# Patient Record
Sex: FEMALE | Race: WHITE | Employment: FULL TIME | RURAL
[De-identification: names, ages, dates, MRNs, and addresses within clinical notes are randomized per-mention and may not be internally consistent; named-entity substitution may affect disease eponyms.]

---

## 2017-01-16 DIAGNOSIS — Z12.31 ENCOUNTER FOR SCREENING MAMMOGRAM FOR BREAST CANCER: ICD-10-CM

## 2017-03-21 ENCOUNTER — OFFICE VISIT (OUTPATIENT)
Dept: FAMILY MEDICINE CLINIC | Age: 47
End: 2017-03-21

## 2017-03-21 VITALS
BODY MASS INDEX: 34.01 KG/M2 | SYSTOLIC BLOOD PRESSURE: 136 MMHG | DIASTOLIC BLOOD PRESSURE: 84 MMHG | HEIGHT: 66 IN | TEMPERATURE: 97.4 F | WEIGHT: 211.6 LBS | RESPIRATION RATE: 18 BRPM | OXYGEN SATURATION: 95 % | HEART RATE: 69 BPM

## 2017-03-21 DIAGNOSIS — I10 ESSENTIAL HYPERTENSION: Primary | ICD-10-CM

## 2017-03-21 DIAGNOSIS — M72.2 PLANTAR FASCIITIS, BILATERAL: ICD-10-CM

## 2017-03-21 RX ORDER — LISINOPRIL 10 MG/1
10 TABLET ORAL DAILY
Qty: 90 TAB | Refills: 1 | Status: SHIPPED | OUTPATIENT
Start: 2017-03-21 | End: 2017-10-12 | Stop reason: SDUPTHER

## 2017-03-21 NOTE — MR AVS SNAPSHOT
Visit Information Date & Time Provider Department Dept. Phone Encounter #  
 3/21/2017 11:00 AM Eugenio Martin MD [facility] [phone] [id number] Follow-up Instructions Return in about 6 months (around 9/21/2017). Upcoming Health Maintenance Date Due DTaP/Tdap/Td series (1 - Tdap) 12/24/1990 PAP AKA CERVICAL CYTOLOGY 12/24/1990 INFLUENZA AGE 9 TO ADULT 8/1/2016 Allergies as of 3/21/2017  Review Complete On: 3/21/2017 By: Eugenio Martin MD  
  
 Severity Noted Reaction Type Reactions Penicillins  11/16/2011    Rash, Itching Sulfa (Sulfonamide Antibiotics)  11/16/2011    Itching Current Immunizations  Reviewed on 10/9/2014 Name Date Hib 6/4/2014 Influenza Vaccine 10/5/2014 Not reviewed this visit You Were Diagnosed With   
  
 Codes Comments Essential hypertension    -  Primary ICD-10-CM: I10 
ICD-9-CM: 401.9 Vitals Height(growth percentile) LMP OB Status Smoking Status 5' 6\" (1.676 m) 10/26/2012 Hysterectomy Never Smoker Preferred Pharmacy Pharmacy Name Phone [facility] [place] [address], [place] - [address] [phone] Your Updated Medication List  
  
   
This list is accurate as of: 3/21/17 11:21 AM.  Always use your most recent med list.  
  
  
  
  
 lisinopril 10 mg tablet Commonly known as:  Gricel Garcia Take 1 Tab by mouth daily. VITAMIN D3 1,000 unit Cap Generic drug:  cholecalciferol Take  by mouth. Prescriptions Sent to Pharmacy Refills  
 lisinopril (PRINIVIL, ZESTRIL) 10 mg tablet 1 Sig: Take 1 Tab by mouth daily. Class: Normal  
 Pharmacy: [address] [facility] [address] Ph #: [phone] Route: Oral  
  
Follow-up Instructions Return in about 6 months (around 9/21/2017). Introducing [facility] & HEALTH SERVICES! [employer] introduces [employer] patient portal. Now you can access parts of your medical record, email your doctor's office, and request medication refills online. 1. In your internet browser, go to [email or web address] 2. Click on the First Time User? Click Here link in the Sign In box. You will see the New Member Sign Up page. 3. Enter your Diet TV Access Code exactly as it appears below. You will not need to use this code after youve completed the sign-up process. If you do not sign up before the expiration date, you must request a new code. · [employer] Access Code: [email or web address] Expires: 6/19/2017 11:21 AM 
 
4. Enter the last four digits of your Social Security Number (xxxx) and Date of Birth (mm/dd/yyyy) as indicated and click Submit. You will be taken to the next sign-up page. 5. Create a Diet TV ID. This will be your Diet TV login ID and cannot be changed, so think of one that is secure and easy to remember. 6. Create a Diet TV password. You can change your password at any time. 7. Enter your Password Reset Question and Answer. This can be used at a later time if you forget your password. 8. Enter your e-mail address. You will receive e-mail notification when new information is available in [address] 9. Click Sign Up. You can now view and download portions of your medical record. 10. Click the Download Summary menu link to download a portable copy of your medical information. If you have questions, please visit the Frequently Asked Questions section of the Diet TV website. Remember, Diet TV is NOT to be used for urgent needs. For medical emergencies, dial 911. Now available from your iPhone and Android! Please provide this summary of care documentation to your next provider. Your primary care clinician is listed as Renée Ceron. If you have any questions after today's visit, please call [phone].

## 2017-03-21 NOTE — PROGRESS NOTES
HISTORY OF PRESENT ILLNESS  Margaret Ormond is a 51 y.o. female. Hypertension    Pertinent negatives include no chest pain, no malaise/fatigue, no headaches and no dizziness. She recently became established as a patient. Her BP had been elevated for the last few months and she was started on Lisinopril in Dec. Tolerating well. No cough. She is having right heel pain. Worse first thing in the am and after sitting for a period of time. She is doing her stretches in the am and pm and symptoms have improved. Had an abnormal mammo in Jan with negative biopsy. Review of Systems   Constitutional: Negative for fever and malaise/fatigue. Respiratory: Negative for cough. Cardiovascular: Negative for chest pain. Gastrointestinal: Negative for abdominal pain. Musculoskeletal: Positive for joint pain. Neurological: Negative for dizziness and headaches. Past Medical History:   Diagnosis Date    Fibroids     Menorrhagia     Vitamin D deficiency      Past Surgical History:   Procedure Laterality Date    BIOPSY OF BREAST, NEEDLE CORE Right 01/12/2017    Benign    BREAST SURGERY PROCEDURE UNLISTED  12/10    breast bx-benign    HX CHOLECYSTECTOMY      HX GYN      hysterectomy    HX HEENT      tonsillectomy     Allergies   Allergen Reactions    Penicillins Rash and Itching    Sulfa (Sulfonamide Antibiotics) Itching     Height 5' 6\" (1.676 m), last menstrual period 10/26/2012. Physical Exam   Constitutional: She appears well-developed and well-nourished. No distress. HENT:   Head: Normocephalic. Mouth/Throat: Oropharynx is clear and moist.   Eyes: Conjunctivae are normal.   Neck: Neck supple. Cardiovascular: Normal rate, regular rhythm and normal heart sounds. Pulmonary/Chest: Effort normal and breath sounds normal. No respiratory distress. Neurological: She is alert. Skin: Skin is warm. Psychiatric: She has a normal mood and affect. Vitals reviewed.     Results for orders placed or performed in visit on 33/42/70   METABOLIC PANEL, COMPREHENSIVE   Result Value Ref Range    Glucose 106 (H) 65 - 99 mg/dL    BUN 19 6 - 24 mg/dL    Creatinine 0.88 0.57 - 1.00 mg/dL    GFR est non-AA 79 >59 mL/min/1.73    GFR est AA 91 >59 mL/min/1.73    BUN/Creatinine ratio 22 9 - 23    Sodium 140 134 - 144 mmol/L    Potassium 4.8 3.5 - 5.2 mmol/L    Chloride 101 96 - 106 mmol/L    CO2 24 18 - 29 mmol/L    Calcium 9.0 8.7 - 10.2 mg/dL    Protein, total 7.1 6.0 - 8.5 g/dL    Albumin 4.2 3.5 - 5.5 g/dL    GLOBULIN, TOTAL 2.9 1.5 - 4.5 g/dL    A-G Ratio 1.4 1.1 - 2.5    Bilirubin, total 0.6 0.0 - 1.2 mg/dL    Alk. phosphatase 80 39 - 117 IU/L    AST (SGOT) 16 0 - 40 IU/L    ALT (SGPT) 13 0 - 32 IU/L   THYROID PANEL W/TSH   Result Value Ref Range    TSH 1.200 0.450 - 4.500 uIU/mL    T4, Total 10.4 4.5 - 12.0 ug/dL    T3 Uptake 26 24 - 39 %    Free Thyroxine Index 2.7 1.2 - 4.9   HEMOGLOBIN A1C WITH EAG   Result Value Ref Range    Hemoglobin A1c 5.7 (H) 4.8 - 5.6 %    Estimated average glucose 117 mg/dL   LIPID PANEL   Result Value Ref Range    Cholesterol, total 155 100 - 199 mg/dL    Triglyceride 85 0 - 149 mg/dL    HDL Cholesterol 47 >39 mg/dL    VLDL, calculated 17 5 - 40 mg/dL    LDL, calculated 91 0 - 99 mg/dL   CBC WITH AUTOMATED DIFF   Result Value Ref Range    WBC 9.9 3.4 - 10.8 x10E3/uL    RBC 5.08 3.77 - 5.28 x10E6/uL    HGB 14.9 11.1 - 15.9 g/dL    HCT 44.3 34.0 - 46.6 %    MCV 87 79 - 97 fL    MCH 29.3 26.6 - 33.0 pg    MCHC 33.6 31.5 - 35.7 g/dL    RDW 13.3 12.3 - 15.4 %    PLATELET 146 303 - 691 x10E3/uL    NEUTROPHILS 67 %    Lymphocytes 25 %    MONOCYTES 5 %    EOSINOPHILS 3 %    BASOPHILS 0 %    ABS. NEUTROPHILS 6.6 1.4 - 7.0 x10E3/uL    Abs Lymphocytes 2.5 0.7 - 3.1 x10E3/uL    ABS. MONOCYTES 0.5 0.1 - 0.9 x10E3/uL    ABS. EOSINOPHILS 0.3 0.0 - 0.4 x10E3/uL    ABS. BASOPHILS 0.0 0.0 - 0.2 x10E3/uL    IMMATURE GRANULOCYTES 0 %    ABS. IMM.  GRANS. 0.0 0.0 - 0.1 x10E3/uL   CVD REPORT   Result Value Ref Range    INTERPRETATION Note        ASSESSMENT and PLAN  HTN   Continue Lisinopril 10mg daily   RTO 6 months  Plantar fascitis   Continue exercises    Heel cups, change shoes every few hours

## 2017-10-12 ENCOUNTER — OFFICE VISIT (OUTPATIENT)
Dept: FAMILY MEDICINE CLINIC | Age: 47
End: 2017-10-12

## 2017-10-12 VITALS
DIASTOLIC BLOOD PRESSURE: 71 MMHG | SYSTOLIC BLOOD PRESSURE: 124 MMHG | RESPIRATION RATE: 17 BRPM | OXYGEN SATURATION: 97 % | WEIGHT: 217 LBS | TEMPERATURE: 97.5 F | HEART RATE: 100 BPM | HEIGHT: 66 IN | BODY MASS INDEX: 34.87 KG/M2

## 2017-10-12 DIAGNOSIS — R63.5 WEIGHT GAIN: ICD-10-CM

## 2017-10-12 DIAGNOSIS — I10 ESSENTIAL HYPERTENSION: Primary | ICD-10-CM

## 2017-10-12 DIAGNOSIS — R73.09 ELEVATED GLUCOSE: ICD-10-CM

## 2017-10-12 RX ORDER — LISINOPRIL 10 MG/1
10 TABLET ORAL DAILY
Qty: 90 TAB | Refills: 1 | Status: SHIPPED | OUTPATIENT
Start: 2017-10-12 | End: 2018-01-29 | Stop reason: SDUPTHER

## 2017-10-12 NOTE — PROGRESS NOTES
HISTORY OF PRESENT ILLNESS  Kelly Jefferson is a 51 y.o. female. Hypertension    Pertinent negatives include no chest pain, no palpitations, no malaise/fatigue, no headaches and no dizziness. She is here for BP follow up. She was started on Lisinopril a year or so ago. Tolerating well. No cough. She was having right heel pain. Resolved with stretches and ice. She is concerned that she is not able to lose weight. She has been watching her diet but not able to exercise very much. Drinking regular sodas- usually 2-3 a day. Would like to start meds to help. Drinks milk in the am for breakfast, salad for lunch and a regular dinner. Not snacking. Review of Systems   Constitutional: Negative for fever, malaise/fatigue and weight loss. HENT: Negative for congestion and sore throat. Respiratory: Negative for cough and sputum production. Cardiovascular: Negative for chest pain and palpitations. Gastrointestinal: Negative for abdominal pain and heartburn. Neurological: Negative for dizziness and headaches. Psychiatric/Behavioral: Negative for depression. The patient is nervous/anxious. Past Medical History:   Diagnosis Date    Fibroids     Menorrhagia     Vitamin D deficiency      Past Surgical History:   Procedure Laterality Date    BIOPSY OF BREAST, NEEDLE CORE Right 01/12/2017    Benign    BREAST SURGERY PROCEDURE UNLISTED  12/10    breast bx-benign    HX CHOLECYSTECTOMY      HX GYN      hysterectomy    HX HEENT      tonsillectomy     Allergies   Allergen Reactions    Penicillins Rash and Itching    Sulfa (Sulfonamide Antibiotics) Itching     Blood pressure 124/71, pulse 100, temperature 97.5 °F (36.4 °C), temperature source Oral, resp. rate 17, height 5' 6\" (1.676 m), weight 217 lb (98.4 kg), last menstrual period 10/26/2012, SpO2 97 %. Physical Exam   Constitutional: She appears well-developed and well-nourished. No distress. HENT:   Head: Normocephalic and atraumatic. Right Ear: External ear normal.   Left Ear: External ear normal.   Nose: Nose normal.   Mouth/Throat: Oropharynx is clear and moist.   Eyes: Conjunctivae are normal.   Neck: Neck supple. Cardiovascular: Normal rate, regular rhythm and normal heart sounds. Pulmonary/Chest: Effort normal and breath sounds normal. No respiratory distress. Lymphadenopathy:     She has no cervical adenopathy. Neurological: She is alert. Skin: Skin is warm. Psychiatric: She has a normal mood and affect. Vitals reviewed.       ASSESSMENT and PLAN  HTN   Continue Lisinopril 10mg daily   RTO 6 months  Plantar fascitis   Continue exercises   Weight Gain   Start Contrave   ADRS discussed   Try to cut out soda   RTO 3 months

## 2017-10-12 NOTE — MR AVS SNAPSHOT
Visit Information Date & Time Provider Department Dept. Phone Encounter #  
 10/12/2017 10:00 AM Vinny Arango MD [facility] Primary [facility] [phone] [id number] Upcoming Health Maintenance Date Due DTaP/Tdap/Td series (1 - Tdap) 12/24/1990 PAP AKA CERVICAL CYTOLOGY 12/24/1990 Allergies as of 10/12/2017  Review Complete On: 10/12/2017 By: Mary Mensah LPN Severity Noted Reaction Type Reactions Penicillins  11/16/2011    Rash, Itching Sulfa (Sulfonamide Antibiotics)  11/16/2011    Itching Current Immunizations  Reviewed on 10/9/2014 Name Date Hib 6/4/2014 Influenza Vaccine 10/6/2017, 10/5/2014 Not reviewed this visit You Were Diagnosed With   
  
 Codes Comments Essential hypertension    -  Primary ICD-10-CM: I10 
ICD-9-CM: 401.9 Weight gain     ICD-10-CM: R63.5 ICD-9-CM: 783.1 Elevated glucose     ICD-10-CM: R73.09 
ICD-9-CM: 790.29 Vitals BP Pulse Temp Resp Height(growth percentile) Weight(growth percentile) 124/71 (BP 1 Location: Right arm, BP Patient Position: Sitting) 100 97.5 °F (36.4 °C) (Oral) 17 5' 6\" (1.676 m) 217 lb (98.4 kg) LMP SpO2 BMI OB Status Smoking Status 10/26/2012 97% 35.02 kg/m2 Hysterectomy Never Smoker BMI and BSA Data Body Mass Index Body Surface Area 35.02 kg/m 2 2.14 m 2 Preferred Pharmacy Pharmacy Name Phone [facility] [place] [address], [place] - [address] [phone] Your Updated Medication List  
  
   
This list is accurate as of: 10/12/17 10:34 AM.  Always use your most recent med list.  
  
  
  
  
 lisinopril 10 mg tablet Commonly known as:  Sheree Cleaning Take 1 Tab by mouth daily. naltrexone-buPROPion 8-90 mg Tber ER tablet Commonly known as:  Main Scot Week 1 1 tab PO QAM, Week 2 1QAM 1QHS, Week 3 2QAM 1 QHS, Week 4 & beyond 2QAM 2QHS  
  
 VITAMIN D3 1,000 unit Cap Generic drug:  cholecalciferol Take  by mouth. Prescriptions Sent to Pharmacy Refills  
 lisinopril (PRINIVIL, ZESTRIL) 10 mg tablet 1 Sig: Take 1 Tab by mouth daily. Class: Normal  
 Pharmacy: [address] [place] [address] Ph #: [phone] Route: Oral  
 naltrexone-buPROPion (CONTRAVE) 8-90 mg TbER ER tablet 0 Sig: Week 1 1 tab PO QAM, Week 2 1QAM 1QHS, Week 3 2QAM 1 QHS, Week 4 & beyond 2QAM 2QHS Class: Normal  
 Pharmacy: [address] [facility] Ph #: [phone] We Performed the Following HEMOGLOBIN A1C WITH EAG [04089 CPT(R)] METABOLIC PANEL, COMPREHENSIVE [06526 CPT(R)] LA COLLECTION VENOUS BLOOD,VENIPUNCTURE B2126876 CPT(R)] TSH 3RD GENERATION [00857 CPT(R)] Introducing [facility] & HEALTH SERVICES! [employer] introduces [employer] patient portal. Now you can access parts of your medical record, email your doctor's office, and request medication refills online. 1. In your internet browser, go to [email or web address] 2. Click on the First Time User? Click Here link in the Sign In box. You will see the New Member Sign Up page. 3. Enter your [employer] Access Code exactly as it appears below. You will not need to use this code after youve completed the sign-up process. If you do not sign up before the expiration date, you must request a new code. · [employer] Access Code: [email or web address] Expires: 1/10/2018 10:34 AM 
 
4. Enter the last four digits of your Social Security Number (xxxx) and Date of Birth (mm/dd/yyyy) as indicated and click Submit. You will be taken to the next sign-up page. 5. Create a MashONt ID. This will be your [employer] login ID and cannot be changed, so think of one that is secure and easy to remember. 6. Create a [employer] password. You can change your password at any time. 7. Enter your Password Reset Question and Answer. This can be used at a later time if you forget your password. 8. Enter your e-mail address. You will receive e-mail notification when new information is available in [address] 9. Click Sign Up. You can now view and download portions of your medical record. 10. Click the Download Summary menu link to download a portable copy of your medical information. If you have questions, please visit the Frequently Asked Questions section of the [employer] website. Remember, [employer] is NOT to be used for urgent needs. For medical emergencies, dial 911. Now available from your iPhone and Android! Please provide this summary of care documentation to your next provider. Your primary care clinician is listed as Clifford Loyola. If you have any questions after today's visit, please call [phone].

## 2017-10-13 LAB
ALBUMIN SERPL-MCNC: 3.9 G/DL (ref 3.5–5.5)
ALBUMIN/GLOB SERPL: 1.4 {RATIO} (ref 1.2–2.2)
ALP SERPL-CCNC: 73 IU/L (ref 39–117)
ALT SERPL-CCNC: 9 IU/L (ref 0–32)
AST SERPL-CCNC: 10 IU/L (ref 0–40)
BILIRUB SERPL-MCNC: 0.3 MG/DL (ref 0–1.2)
BUN SERPL-MCNC: 12 MG/DL (ref 6–24)
BUN/CREAT SERPL: 13 (ref 9–23)
CALCIUM SERPL-MCNC: 8.8 MG/DL (ref 8.7–10.2)
CHLORIDE SERPL-SCNC: 103 MMOL/L (ref 96–106)
CO2 SERPL-SCNC: 25 MMOL/L (ref 18–29)
CREAT SERPL-MCNC: 0.91 MG/DL (ref 0.57–1)
EST. AVERAGE GLUCOSE BLD GHB EST-MCNC: 114 MG/DL
GLOBULIN SER CALC-MCNC: 2.7 G/DL (ref 1.5–4.5)
GLUCOSE SERPL-MCNC: 105 MG/DL (ref 65–99)
HBA1C MFR BLD: 5.6 % (ref 4.8–5.6)
POTASSIUM SERPL-SCNC: 4.6 MMOL/L (ref 3.5–5.2)
PROT SERPL-MCNC: 6.6 G/DL (ref 6–8.5)
SODIUM SERPL-SCNC: 142 MMOL/L (ref 134–144)
TSH SERPL DL<=0.005 MIU/L-ACNC: 1.18 UIU/ML (ref 0.45–4.5)

## 2018-01-29 ENCOUNTER — OFFICE VISIT (OUTPATIENT)
Dept: FAMILY MEDICINE CLINIC | Age: 48
End: 2018-01-29

## 2018-01-29 VITALS
SYSTOLIC BLOOD PRESSURE: 129 MMHG | HEART RATE: 79 BPM | RESPIRATION RATE: 18 BRPM | HEIGHT: 66 IN | OXYGEN SATURATION: 96 % | BODY MASS INDEX: 32.85 KG/M2 | WEIGHT: 204.38 LBS | DIASTOLIC BLOOD PRESSURE: 82 MMHG

## 2018-01-29 DIAGNOSIS — Z12.39 SCREENING FOR BREAST CANCER: ICD-10-CM

## 2018-01-29 DIAGNOSIS — I10 ESSENTIAL HYPERTENSION: Primary | ICD-10-CM

## 2018-01-29 DIAGNOSIS — I73.00 RAYNAUD'S DISEASE WITHOUT GANGRENE: ICD-10-CM

## 2018-01-29 RX ORDER — LISINOPRIL 10 MG/1
10 TABLET ORAL DAILY
Qty: 90 TAB | Refills: 1 | Status: SHIPPED | OUTPATIENT
Start: 2018-01-29 | End: 2018-07-30 | Stop reason: SDUPTHER

## 2018-01-29 NOTE — PROGRESS NOTES
HISTORY OF PRESENT ILLNESS  Graciela Nath is a 49 y.o. female. Hypertension    Pertinent negatives include no chest pain, no palpitations, no malaise/fatigue, no headaches and no dizziness. She is here for BP follow up. She was started on Lisinopril a year or so ago. Tolerating well. No cough. She is concerned that she is not able to lose weight. She has been better with her diet and is exercising more. Cut back on sodas. Drinks milk in the am for breakfast, salad for lunch and a regular dinner. Not snacking. She started Contrave and weight is down 13 pounds. She is having discoloration of her distal fingers for the past couple of months. Started this winter. Review of Systems   Constitutional: Negative for fever, malaise/fatigue and weight loss. HENT: Negative for congestion and sore throat. Respiratory: Negative for cough and sputum production. Cardiovascular: Negative for chest pain and palpitations. Gastrointestinal: Negative for abdominal pain and heartburn. Neurological: Negative for dizziness and headaches. Psychiatric/Behavioral: Negative for depression. The patient is nervous/anxious. Past Medical History:   Diagnosis Date    Fibroids     Menorrhagia     Vitamin D deficiency      Past Surgical History:   Procedure Laterality Date    BREAST SURGERY PROCEDURE UNLISTED  12/10    breast bx-benign    HX CHOLECYSTECTOMY      HX GYN      hysterectomy    HX HEENT      tonsillectomy    TX BIOPSY OF BREAST, NEEDLE CORE Right 01/12/2017    Benign     Allergies   Allergen Reactions    Penicillins Rash and Itching    Sulfa (Sulfonamide Antibiotics) Itching     Blood pressure 129/82, pulse 79, resp. rate 18, height 5' 6\" (1.676 m), weight 204 lb 6 oz (92.7 kg), last menstrual period 10/26/2012, SpO2 96 %. Physical Exam   Constitutional: She appears well-developed and well-nourished. No distress. HENT:   Head: Normocephalic and atraumatic.    Right Ear: External ear normal.   Left Ear: External ear normal.   Nose: Nose normal.   Mouth/Throat: Oropharynx is clear and moist.   Eyes: Conjunctivae are normal.   Neck: Neck supple. Cardiovascular: Normal rate, regular rhythm and normal heart sounds. Pulmonary/Chest: Effort normal and breath sounds normal. No respiratory distress. Lymphadenopathy:     She has no cervical adenopathy. Neurological: She is alert. Skin: Skin is warm. Psychiatric: She has a normal mood and affect. Vitals reviewed.       ASSESSMENT and PLAN  HTN   Continue Lisinopril 10mg daily   RTO 6 months  Weight Gain/BMI 32   Continue Contrave   ADRS discussed   Monitor weight    RTO 3 months  Raynauds   Gloves whenever temp is below 39   RTO or call if worsening symptoms  Screening breast cancer   Schedule Mammo

## 2018-01-29 NOTE — MR AVS SNAPSHOT
[address] [phone] Patient: Eugene Kaye MRN: [id number] :1970 Visit Information Date & Time Provider Department Dept. Phone Encounter #  
 2018  4:20 PM Jameson Zhang  [address] [facility] [phone] [id number] Follow-up Instructions Return in about 6 months (around 2018). Upcoming Health Maintenance Date Due DTaP/Tdap/Td series (1 - Tdap) 1990 PAP AKA CERVICAL CYTOLOGY 1990 Allergies as of 2018  Review Complete On: 2018 By: Jameson Zhang MD  
  
 Severity Noted Reaction Type Reactions Penicillins  2011    Rash, Itching Sulfa (Sulfonamide Antibiotics)  2011    Itching Current Immunizations  Reviewed on 10/9/2014 Name Date Hib 2014 Influenza Vaccine 10/6/2017, 10/5/2014 Not reviewed this visit You Were Diagnosed With   
  
 Codes Comments Essential hypertension    -  Primary ICD-10-CM: I10 
ICD-9-CM: 401.9 Raynaud's disease without gangrene     ICD-10-CM: I73.00 ICD-9-CM: 443.0 Screening for breast cancer     ICD-10-CM: Z12.31 
ICD-9-CM: V76.10 BMI 32.0-32.9,adult     ICD-10-CM: K28.78 
ICD-9-CM: V85.32 Vitals BP Pulse Resp Height(growth percentile) Weight(growth percentile) LMP  
 129/82 (BP 1 Location: Right arm) 79 18 5' 6\" (1.676 m) 204 lb 6 oz (92.7 kg) 10/26/2012 SpO2 BMI OB Status Smoking Status 96% 32.99 kg/m2 Hysterectomy Never Smoker Vitals History BMI and BSA Data Body Mass Index Body Surface Area  
 32.99 kg/m 2 2.08 m 2 Preferred Pharmacy Pharmacy Name Phone [address] [place] [address] [phone] Your Updated Medication List  
  
   
This list is accurate as of: 18  5:14 PM.  Always use your most recent med list.  
  
  
  
  
 lisinopril 10 mg tablet Commonly known as:  Laurent Nicole Take 1 Tab by mouth daily. naltrexone-buPROPion 8-90 mg Tber ER tablet Commonly known as:  Rosario Screen 2QAM 2QHS  
  
 VITAMIN D3 1,000 unit Cap Generic drug:  cholecalciferol Take  by mouth. Prescriptions Sent to Pharmacy Refills  
 lisinopril (PRINIVIL, ZESTRIL) 10 mg tablet 1 Sig: Take 1 Tab by mouth daily. Class: Normal  
 Pharmacy: [facility] [address] [place] [address] Ph #: [phone] Route: Oral  
 naltrexone-buPROPion (CONTRAVE) 8-90 mg TbER ER tablet 5 SiQAM 2QHS Class: Normal  
 Pharmacy: [facility] [address] [place] [address] Ph #: [phone] Follow-up Instructions Return in about 6 months (around 2018). To-Do List   
 2018 Imaging:  JIGNESH MAMMO BI SCREENING INCL CAD Introducing [facility] & HEALTH SERVICES! [facility] introduces [employer] patient portal. Now you can access parts of your medical record, email your doctor's office, and request medication refills online. 1. In your internet browser, go to [email or web address] 2. Click on the First Time User? Click Here link in the Sign In box. You will see the New Member Sign Up page. 3. Enter your Strong[employer] Access Code exactly as it appears below. You will not need to use this code after youve completed the sign-up process. If you do not sign up before the expiration date, you must request a new code. · [employer] Access Code: [email or web address] Expires: 2018  5:14 PM 
 
4. Enter the last four digits of your Social Security Number (xxxx) and Date of Birth (mm/dd/yyyy) as indicated and click Submit. You will be taken to the next sign-up page. 5. Create a [employer] ID. This will be your Strong[employer] login ID and cannot be changed, so think of one that is secure and easy to remember. 6. Create a Wallop password. You can change your password at any time. 7. Enter your Password Reset Question and Answer. This can be used at a later time if you forget your password. 8. Enter your e-mail address. You will receive e-mail notification when new information is available in [address] 9. Click Sign Up. You can now view and download portions of your medical record. 10. Click the Download Summary menu link to download a portable copy of your medical information. If you have questions, please visit the Frequently Asked Questions section of the [employer] website. Remember, Wall[employer] is NOT to be used for urgent needs. For medical emergencies, dial 911. Now available from your iPhone and Android! Please provide this summary of care documentation to your next provider. Your primary care clinician is listed as Dionte Burden. If you have any questions after today's visit, please call [phone].

## 2018-07-30 ENCOUNTER — OFFICE VISIT (OUTPATIENT)
Dept: FAMILY MEDICINE CLINIC | Age: 48
End: 2018-07-30

## 2018-07-30 VITALS
BODY MASS INDEX: 32.3 KG/M2 | HEIGHT: 66 IN | WEIGHT: 201 LBS | RESPIRATION RATE: 18 BRPM | SYSTOLIC BLOOD PRESSURE: 120 MMHG | DIASTOLIC BLOOD PRESSURE: 78 MMHG | OXYGEN SATURATION: 97 % | HEART RATE: 77 BPM

## 2018-07-30 DIAGNOSIS — I10 ESSENTIAL HYPERTENSION: Primary | ICD-10-CM

## 2018-07-30 RX ORDER — LISINOPRIL 10 MG/1
10 TABLET ORAL DAILY
Qty: 90 TAB | Refills: 1 | Status: SHIPPED | OUTPATIENT
Start: 2018-07-30 | End: 2019-01-28 | Stop reason: SDUPTHER

## 2018-07-30 NOTE — PROGRESS NOTES
HISTORY OF PRESENT ILLNESS Sylvia Zacarias is a 49 y.o. female. Hypertension Associated symptoms include nausea. Pertinent negatives include no chest pain, no palpitations, no malaise/fatigue, no headaches and no dizziness. She is here for BP follow up. She is on Lisinopril. Tolerating well. No cough. She has been better with her diet and is exercising more. Cut back on sodas. Drinks milk in the am for breakfast, salad for lunch and a regular dinner. Was on vacation and cheated some. She has been on Contrave for the past 6 months and weight is down 16  pounds. Can't take the 4 tablets a day due to nausea. On 1 BID. Had an abnormal mammo at [facility] and has a follow up scheduled. Possible biopsy may be needed. Has GYN care with Dr Bishop Mar. Review of Systems Constitutional: Negative for fever, malaise/fatigue and weight loss. HENT: Negative for congestion and sore throat. Respiratory: Negative for cough and sputum production. Cardiovascular: Negative for chest pain and palpitations. Gastrointestinal: Positive for nausea. Negative for abdominal pain and heartburn. Neurological: Negative for dizziness and headaches. Psychiatric/Behavioral: Negative for depression. Past Medical History:  
Diagnosis Date  Fibroids  Menorrhagia  Vitamin D deficiency Past Surgical History:  
Procedure Laterality Date  BREAST SURGERY PROCEDURE UNLISTED  12/10  
 breast bx-benign  HX CHOLECYSTECTOMY  HX GYN    
 hysterectomy  HX HEENT    
 tonsillectomy  OK BIOPSY OF BREAST, NEEDLE CORE Right 01/12/2017 Benign Allergies Allergen Reactions  Penicillins Rash and Itching  Sulfa (Sulfonamide Antibiotics) Itching Blood pressure 120/78, pulse 77, resp. rate 18, height 5' 6\" (1.676 m), weight 201 lb (91.2 kg), last menstrual period 10/26/2012, SpO2 97 %. Physical Exam  
Constitutional: She is oriented to person, place, and time.  She appears well-developed and well-nourished. No distress. HENT:  
Head: Normocephalic and atraumatic. Nose: Nose normal.  
Mouth/Throat: Oropharynx is clear and moist.  
Eyes: Conjunctivae are normal.  
Neck: Neck supple. Cardiovascular: Normal rate, regular rhythm and normal heart sounds. Pulmonary/Chest: Effort normal and breath sounds normal. No respiratory distress. Lymphadenopathy:  
  She has no cervical adenopathy. Neurological: She is alert and oriented to person, place, and time. Skin: Skin is warm. Psychiatric: She has a normal mood and affect. Vitals reviewed. ASSESSMENT and PLAN 
HTN Continue Lisinopril 10mg daily Check labs RTO 6 months Weight Gain/BMI 32 Continue Contrave and try to increase to 2 po BID ADRS discussed Monitor weight RTO 6 months Abnormal Mammo Has a repeat scheduled and will ask them to send results

## 2018-07-30 NOTE — PROGRESS NOTES
1. Have you been to the ER, urgent care clinic since your last visit? Hospitalized since your last visit? No 
 
2. Have you seen or consulted any other health care providers outside of the [address] [place] [address] since your last visit? Include any pap smears or colon screening.  No

## 2018-07-30 NOTE — MR AVS SNAPSHOT
[address] [phone] Patient: Chayito Laurent MRN: [id number] :[id number] Visit Information Date & Time Provider Department Dept. Phone Encounter #  
 2018  9:00 AM Melania Reece MD [address] [address] [phone] [id number] Follow-up Instructions Return in about 6 months (around 2019). Follow-up and Disposition History Upcoming Health Maintenance Date Due DTaP/Tdap/Td series (1 - Tdap) 1990 PAP AKA CERVICAL CYTOLOGY 1990 Influenza Age 5 to Adult 2018 Allergies as of 2018  Review Complete On: 2018 By: Melania Reece MD  
  
 Severity Noted Reaction Type Reactions Penicillins  2011    Rash, Itching Sulfa (Sulfonamide Antibiotics)  2011    Itching Current Immunizations  Reviewed on 10/9/2014 Name Date Hib 2014 Influenza Vaccine 10/6/2017, 10/5/2014 Not reviewed this visit You Were Diagnosed With   
  
 Codes Comments Essential hypertension    -  Primary ICD-10-CM: I10 
ICD-9-CM: 401.9 BMI 32.0-32.9,adult     ICD-10-CM: I68.29 
ICD-9-CM: V85.32 Vitals BP Pulse Resp Height(growth percentile) Weight(growth percentile) LMP  
 120/78 (BP 1 Location: Left arm) 77 18 5' 6\" (1.676 m) 201 lb (91.2 kg) 10/26/2012 SpO2 BMI OB Status Smoking Status 97% 32.44 kg/m2 Hysterectomy Never Smoker Vitals History BMI and BSA Data Body Mass Index Body Surface Area  
 32.44 kg/m 2 2.06 m 2 Preferred Pharmacy Pharmacy Name Phone [address] [phone] Your Updated Medication List  
  
   
This list is accurate as of 18  9:38 AM.  Always use your most recent med list.  
  
  
  
  
 lisinopril 10 mg tablet Commonly known as:  Ruth Mckeon  
 Take 1 Tab by mouth daily. naltrexone-buPROPion 8-90 mg Tber ER tablet Commonly known as:  [place] 2QAM 2QHS  
  
 VITAMIN D3 1,000 unit Cap Generic drug:  cholecalciferol Take  by mouth. Prescriptions Sent to Pharmacy Refills  
 lisinopril (PRINIVIL, ZESTRIL) 10 mg tablet 1 Sig: Take 1 Tab by mouth daily. Class: Normal  
 Pharmacy: [facility] [address] Ph #: [phone] Route: Oral  
 naltrexone-buPROPion (CONTRAVE) 8-90 mg TbER ER tablet 5 SiQAM 2QHS Class: Normal  
 Pharmacy: [facility] [address] Ph #: [phone] We Performed the Following CBC WITH AUTOMATED DIFF [00470 CPT(R)] METABOLIC PANEL, COMPREHENSIVE [22782 CPT(R)] Follow-up Instructions Return in about 6 months (around 2019). Introducing [facility]! [facility] introduces 365webcall patient portal. Now you can access parts of your medical record, email your doctor's office, and request medication refills online. 1. In your internet browser, go to [email or web address] 2. Click on the First Time User? Click Here link in the Sign In box. You will see the New Member Sign Up page. 3. Enter your 365webcall Access Code exactly as it appears below. You will not need to use this code after youve completed the sign-up process. If you do not sign up before the expiration date, you must request a new code. · 365webcall Access Code: [email or web address] Expires: 10/28/2018  9:37 AM 
 
4. Enter the last four digits of your Social Security Number (xxxx) and Date of Birth (mm/dd/yyyy) as indicated and click Submit. You will be taken to the next sign-up page. 5. Create a 365webcall ID. This will be your 365webcall login ID and cannot be changed, so think of one that is secure and easy to remember. 6. Create a 365webcall password. You can change your password at any time. 7. Enter your Password Reset Question and Answer. This can be used at a later time if you forget your password. 8. Enter your e-mail address. You will receive e-mail notification when new information is available in [address] 9. Click Sign Up. You can now view and download portions of your medical record. 10. Click the Download Summary menu link to download a portable copy of your medical information. If you have questions, please visit the Frequently Asked Questions section of the [employer] website. Remember, [employer] is NOT to be used for urgent needs. For medical emergencies, dial 911. Now available from your iPhone and Android! Please provide this summary of care documentation to your next provider. Your primary care clinician is listed as Elena Dominguez. If you have any questions after today's visit, please call [phone].

## 2018-07-31 LAB
ALBUMIN SERPL-MCNC: 4.3 G/DL (ref 3.5–5.5)
ALBUMIN/GLOB SERPL: 1.7 {RATIO} (ref 1.2–2.2)
ALP SERPL-CCNC: 77 IU/L (ref 39–117)
ALT SERPL-CCNC: 11 IU/L (ref 0–32)
AST SERPL-CCNC: 13 IU/L (ref 0–40)
BASOPHILS # BLD AUTO: 0 X10E3/UL (ref 0–0.2)
BASOPHILS NFR BLD AUTO: 0 %
BILIRUB SERPL-MCNC: 0.2 MG/DL (ref 0–1.2)
BUN SERPL-MCNC: 20 MG/DL (ref 6–24)
BUN/CREAT SERPL: 18 (ref 9–23)
CALCIUM SERPL-MCNC: 8.9 MG/DL (ref 8.7–10.2)
CHLORIDE SERPL-SCNC: 101 MMOL/L (ref 96–106)
CO2 SERPL-SCNC: 23 MMOL/L (ref 20–29)
CREAT SERPL-MCNC: 1.12 MG/DL (ref 0.57–1)
EOSINOPHIL # BLD AUTO: 0.2 X10E3/UL (ref 0–0.4)
EOSINOPHIL NFR BLD AUTO: 3 %
ERYTHROCYTE [DISTWIDTH] IN BLOOD BY AUTOMATED COUNT: 13.2 % (ref 12.3–15.4)
GLOBULIN SER CALC-MCNC: 2.5 G/DL (ref 1.5–4.5)
GLUCOSE SERPL-MCNC: 100 MG/DL (ref 65–99)
HCT VFR BLD AUTO: 42.1 % (ref 34–46.6)
HGB BLD-MCNC: 14 G/DL (ref 11.1–15.9)
IMM GRANULOCYTES # BLD: 0 X10E3/UL (ref 0–0.1)
IMM GRANULOCYTES NFR BLD: 0 %
INTERPRETATION: NORMAL
LYMPHOCYTES # BLD AUTO: 1.8 X10E3/UL (ref 0.7–3.1)
LYMPHOCYTES NFR BLD AUTO: 22 %
MCH RBC QN AUTO: 29.8 PG (ref 26.6–33)
MCHC RBC AUTO-ENTMCNC: 33.3 G/DL (ref 31.5–35.7)
MCV RBC AUTO: 90 FL (ref 79–97)
MONOCYTES # BLD AUTO: 0.4 X10E3/UL (ref 0.1–0.9)
MONOCYTES NFR BLD AUTO: 4 %
NEUTROPHILS # BLD AUTO: 5.7 X10E3/UL (ref 1.4–7)
NEUTROPHILS NFR BLD AUTO: 71 %
PLATELET # BLD AUTO: 233 X10E3/UL (ref 150–379)
POTASSIUM SERPL-SCNC: 4.4 MMOL/L (ref 3.5–5.2)
PROT SERPL-MCNC: 6.8 G/DL (ref 6–8.5)
RBC # BLD AUTO: 4.7 X10E6/UL (ref 3.77–5.28)
SODIUM SERPL-SCNC: 139 MMOL/L (ref 134–144)
WBC # BLD AUTO: 8.2 X10E3/UL (ref 3.4–10.8)

## 2019-08-19 RX ORDER — TOPIRAMATE 100 MG/1
100 TABLET, FILM COATED ORAL
Qty: 60 TAB | Refills: 0 | OUTPATIENT
Start: 2019-08-19 | End: 2019-10-08 | Stop reason: SINTOL

## 2019-08-19 RX ORDER — PHENTERMINE HYDROCHLORIDE 15 MG/1
15 CAPSULE ORAL
Qty: 60 CAP | Refills: 0 | OUTPATIENT
Start: 2019-08-19 | End: 2019-10-08 | Stop reason: SDUPTHER

## 2020-12-10 PROBLEM — E66.01 SEVERE OBESITY (HCC): Status: ACTIVE | Noted: 2020-12-10

## 2021-05-19 PROBLEM — R73.03 PREDIABETES: Status: ACTIVE | Noted: 2021-05-19

## 2021-05-19 NOTE — PROGRESS NOTES
Subjective:     CC: HTN    Kimberly Bishop is a 45 y.o. female who presents today to follow up for HTN. Last seen by NP Marlena Nogueira who recently left the practice. HTN  BP is at goal today. She is on Lisinopril 10mg daily. Denies any CP, SOB, dizziness, or swelling. She is walking for exercise about 2-3 times per week. Prediabetes  Lab Results   Component Value Date/Time    Hemoglobin A1c 5.9 (H) 12/10/2020 10:24 AM     She has been working on diet and weight loss since December. Denies any polyuria or polydipsia. Obesity  She has been taking Phentermine since December for assistance with weight loss. She cut out all soft drinks months ago. She has also been walking for exerce. She has lost 20 pounds since December. Her  is also dieting with her.     Abnormal mammogram, left breast  She had an abnormal mammogram in left breast back in January that prompted a diagnostic mammogram in Feb. It was recommended that she a have a repeat in 6 months- August.    Health maintenance  PAP- s/p hysterectomy  Colonoscopy- has never had one, referred to GI today   Shingrix- she would like to hold off on this today  Covid vaccine-she has had both Moderna vaccines  Eye exam- done 9/2020  Sees the dentist regularly       Patient Active Problem List   Diagnosis Code    Intramural leiomyoma of uterus D25.1    Endometrial hyperplasia with atypia N85.02    Essential hypertension I10    Plantar fasciitis, bilateral M72.2    BMI 32.0-32.9,adult Z68.32    Kidney stone N20.0    Severe obesity ([address] E66.01       Past Medical History:   Diagnosis Date    Fibroids     H/O abdominal hysterectomy     Kidney stone     Menorrhagia     Vitamin D deficiency          Current Outpatient Medications:     phentermine (ADIPEX-P) 37.5 mg tablet, Take 0.5 tablet by mouth twice a day., Disp: 90 Tab, Rfl: 0    lisinopriL (PRINIVIL, ZESTRIL) 10 mg tablet, Take 1 tablet by mouth once daily, Disp: 90 Tab, Rfl: 1    Cholecalciferol, Vitamin D3, (VITAMIN D3) 1,000 unit cap, Take  by mouth.  , Disp: , Rfl:     Allergies   Allergen Reactions    Penicillins Rash and Itching    Sulfa (Sulfonamide Antibiotics) Itching       Past Surgical History:   Procedure Laterality Date    HX CHOLECYSTECTOMY      HX GYN      hysterectomy    HX HEENT      tonsillectomy    SC BIOPSY OF BREAST, NEEDLE CORE Right 01/12/2017    Benign    SC BREAST SURGERY PROCEDURE UNLISTED  12/10    breast bx-benign       Social History     Tobacco Use   Smoking Status Never Smoker   Smokeless Tobacco Never Used       Social History     Socioeconomic History    Marital status:      Spouse name: Not on file    Number of children: Not on file    Years of education: Not on file    Highest education level: Not on file   Tobacco Use    Smoking status: Never Smoker    Smokeless tobacco: Never Used   Substance and Sexual Activity    Alcohol use: Yes     Comment: occasionally    Drug use: No     Social Determinants of Health     Financial Resource Strain:     Difficulty of Paying Living Expenses:    Food Insecurity:     Worried About Running Out of Food in the Last Year:     Ran Out of Food in the Last Year:    Transportation Needs:     Lack of Transportation (Medical):      Lack of Transportation (Non-Medical):    Physical Activity:     Days of Exercise per Week:     Minutes of Exercise per Session:    Stress:     Feeling of Stress :    Social Connections:     Frequency of Communication with Friends and Family:     Frequency of Social Gatherings with Friends and Family:     Attends Confucianist Services:     Active Member of Clubs or Organizations:     Attends Club or Organization Meetings:     Marital Status:        Family History   Problem Relation Age of Onset    Cancer Father 80        skin    Heart Disease Father     Hypertension Father     Stroke Father     Post-op Nausea/Vomiting Mother     Hypertension Mother     Diabetes Mother     Hypertension Brother        ROS:  Gen: denies fever, chills, or fatigue  HEENT:denies H/A, nasal congestion, or sore throat  Resp: denies dyspnea, cough, or wheezing  CV: denies chest pain, pressure, or palpitations  Extremeties: denies edema  GI[de-identified] denies abdominal pain, nausea, vomiting, diarrhea, or constipation  Musculoskeletal: no joint pain, stiffness, or muscle cramps  Neuro: denies numbness/tingling or dizziness  Skin: denies rashes or new lesions   Psych: denies anxiety, depression, christiano, or other changes in mood      Objective:     Visit Vitals  /70 (BP 1 Location: Left arm)   Pulse 63   Temp 98.3 °F (36.8 °C)   Resp 20   Ht 5' 6\" (1.676 m)   Wt 206 lb 9.6 oz (93.7 kg)   SpO2 99%   BMI 33.35 kg/m²       General: Alert and oriented. No acute distress. +obese. HEENT :  Ears:TMs normal. Canals clear. Eyes: Sclera white, conjunctiva clear. PERRLA. Extra ocular movements intact. Nose: Patent. Nasal mucosa pink, non-edematous, and without drainage. Mouth: Pharynx non-erythematous, without exudate   Neck: Supple with FROM. No thyroid-megaly, carotid bruits, or lymphadenopathy  Lungs: Breathing even and unlabored. All lobes clear to auscultation bilaterally   Heart :RRR, S1 and S2 normal intensity, no extra heart sounds  Extremities: Non-edematous. Neuro: Cranial nerves grossly normal.  Psych: Mood and thought content appropriate for situation. Dressed appropriately and with good hygiene. Skin: Warm, dry, and intact. No lesions or discoloration. Assessment/ Plan:     HTN  BP at goal  Cont Lisinopril  Low-sodium diet  Exercise  RTO or go to ER for any CP, SOB, dizziness, or swelling.   F/U:  6 months    Obesity  Discussed with patient that Phentermine is not to be used long term so we will D/C that today since she has been taking it for 5 months now  She has lost 20 pounds  Advised her to continue dieting- recommended a sugar free, low carb diet with regular exercise  We briefly discussed alternative weight loss meds such as Saxenda, Contrave, and Topamax  She will let me know if she is interested in the future  F/U 6 months or sooner prn     Prediabetes  Discussed sugar free, low carb diet with exercise and work on weight loss  F/U 6 months- will recheck A1C    Abnormal mammogram, left breast  Follow up mammo in August.    Health maintenance  PAP- s/p hysterectomy  Colonoscopy- has never had one, referred to GI today   Shingrix- she would like to hold off on this today  Covid vaccine-she has had both [address] [place] vaccines  Eye exam- done 9/2020  Sees the dentist regularly      Verbal and written instructions (see AVS) provided.  Patient expresses understanding of diagnosis and treatment plan. Health Maintenance Due   Topic Date Due    Hepatitis C Screening  Never done    COVID-19 Vaccine (1) Never done    Shingrix Vaccine Age 50> (1 of 2) Never done    Colorectal Cancer Screening Combo  Never done               Sandhya Paul, GERSON

## 2021-05-20 ENCOUNTER — OFFICE VISIT (OUTPATIENT)
Dept: FAMILY MEDICINE CLINIC | Age: 51
End: 2021-05-20
Payer: COMMERCIAL

## 2021-05-20 VITALS
BODY MASS INDEX: 33.2 KG/M2 | RESPIRATION RATE: 20 BRPM | TEMPERATURE: 98.3 F | WEIGHT: 206.6 LBS | SYSTOLIC BLOOD PRESSURE: 106 MMHG | OXYGEN SATURATION: 99 % | DIASTOLIC BLOOD PRESSURE: 70 MMHG | HEIGHT: 66 IN | HEART RATE: 63 BPM

## 2021-05-20 DIAGNOSIS — E66.01 SEVERE OBESITY (HCC): ICD-10-CM

## 2021-05-20 DIAGNOSIS — R73.03 PREDIABETES: ICD-10-CM

## 2021-05-20 DIAGNOSIS — Z12.11 SCREENING FOR COLON CANCER: ICD-10-CM

## 2021-05-20 DIAGNOSIS — I10 ESSENTIAL HYPERTENSION: Primary | ICD-10-CM

## 2021-05-20 PROCEDURE — 99214 OFFICE O/P EST MOD 30 MIN: CPT | Performed by: NURSE PRACTITIONER

## 2021-05-20 NOTE — PROGRESS NOTES
1. Have you been to the ER, urgent care clinic since your last visit? Hospitalized since your last visit?no    2. Have you seen or consulted any other health care providers outside of the [address] [place] [address] since your last visit? Include any pap smears or colon screening.  no

## 2021-05-20 NOTE — PATIENT INSTRUCTIONS
Learning About Low-Carbohydrate Diets What is a low-carbohydrate diet? A low-carbohydrate (or \"low-carb\") diet limits foods and drinks that have carbohydrates. This includes grains, fruits, milk and yogurt, and starchy vegetables like potatoes, beans, and corn. It also avoids foods and drinks that have added sugar. Instead, low-carb diets include foods that are high in protein and fat. Why might you follow a low-carb diet? Low-carb diets may be used for a variety of reasons, such as for weight loss. People who have diabetes may use a low-carb diet to help manage their blood sugar levels. What should you do before you start the diet? Talk to your doctor before you try any diet. This is even more important if you have health problems like kidney disease, heart disease, or diabetes. Your doctor may suggest that you meet with a registered dietitian. A dietitian can help you make an eating plan that works for you. What foods do you eat on a low-carb diet? On a low-carb diet, you choose foods that are high in protein and fat. Examples of these are: · Meat, poultry, and fish. · Eggs. · Nuts, such as walnuts, pecans, almonds, and peanuts. · Peanut butter and other nut butters. · Tofu. · Avocado. · [place]. · Non-starchy vegetables like broccoli, cauliflower, green beans, mushrooms, peppers, lettuce, and spinach. · Unsweetened non-dairy milks like almond milk and coconut milk. · Cheese, cottage cheese, and cream cheese. Current as of: December 17, 2020               Content Version: 12.8 © 2006-2021 [employer]. Care instructions adapted under license by [employer] (which disclaims liability or warranty for this information). If you have questions about a medical condition or this instruction, always ask your healthcare professional. [address] any warranty or liability for your use of this information.

## 2021-05-21 ENCOUNTER — DOCUMENTATION ONLY (OUTPATIENT)
Dept: FAMILY MEDICINE CLINIC | Age: 51
End: 2021-05-21

## 2021-12-17 ENCOUNTER — OFFICE VISIT (OUTPATIENT)
Dept: FAMILY MEDICINE CLINIC | Age: 51
End: 2021-12-17
Payer: COMMERCIAL

## 2021-12-17 VITALS
WEIGHT: 213.38 LBS | HEIGHT: 66 IN | OXYGEN SATURATION: 99 % | RESPIRATION RATE: 18 BRPM | HEART RATE: 64 BPM | TEMPERATURE: 96.3 F | SYSTOLIC BLOOD PRESSURE: 114 MMHG | DIASTOLIC BLOOD PRESSURE: 71 MMHG | BODY MASS INDEX: 34.29 KG/M2

## 2021-12-17 DIAGNOSIS — Z12.11 SCREENING FOR COLON CANCER: ICD-10-CM

## 2021-12-17 DIAGNOSIS — E66.01 SEVERE OBESITY (HCC): ICD-10-CM

## 2021-12-17 DIAGNOSIS — R92.8 ABNORMAL MAMMOGRAM OF LEFT BREAST: ICD-10-CM

## 2021-12-17 DIAGNOSIS — I10 ESSENTIAL HYPERTENSION: Primary | ICD-10-CM

## 2021-12-17 DIAGNOSIS — R73.03 PREDIABETES: ICD-10-CM

## 2021-12-17 PROCEDURE — 99214 OFFICE O/P EST MOD 30 MIN: CPT | Performed by: NURSE PRACTITIONER

## 2021-12-17 RX ORDER — LISINOPRIL 10 MG/1
TABLET ORAL
Qty: 90 TABLET | Refills: 3 | Status: SHIPPED | OUTPATIENT
Start: 2021-12-17 | End: 2022-06-17 | Stop reason: SDUPTHER

## 2021-12-17 RX ORDER — PHENTERMINE HYDROCHLORIDE 37.5 MG/1
TABLET ORAL
Qty: 30 TABLET | Refills: 0 | Status: SHIPPED | OUTPATIENT
Start: 2021-12-17 | End: 2022-06-17 | Stop reason: ALTCHOICE

## 2021-12-17 NOTE — PROGRESS NOTES
Subjective:     CC: HTN    Ayad Armenta is a 45 y.o. female who presents today for a 6 follow up for HTN. HTN  BP is at goal today. She is on Lisinopril 10mg daily. Denies any CP, SOB, dizziness, or swelling. She is walking for exercise about 2-3 times per week. Prediabetes  Lab Results   Component Value Date/Time    Hemoglobin A1c 5.9 (H) 12/10/2020 10:24 AM     She has been working on diet and weight loss for a year now but has gained 7 pounds since last visit due to snacking and stress eating ever since her work hours changed. Denies any polyuria or polydipsia. Obesity  She cut out all soft drinks a year ago. She has also been walking for exerce. She has lost 20 pounds in the past year but has gained 7 pounds back due to stress at work and awkward work hours which has lead to snacking and stress eating. She has taken Phentermine in the past and would like to try it again. Abnormal mammogram, left breast  She had an abnormal mammogram in left breast back in January that prompted a diagnostic mammogram in Feb. It was recommended that she a have a repeat in 6 months. This was done at the [facility] and she was told the lesion has decreased but still present and recommended she follow up for another mammo in March 2022. She will request records be sent to us. Health maintenance  PAP- s/p hysterectomy  Colonoscopy- has never had one, she was referred to GI at last appt but never went. Her  is doing the Cologuard test and she would like to do that instead. No FH of colon cancer. No personal hx of diverticulitis or other bowel issues. Discussed it is possible to have a false neg and false pos and colonoscopy is most accurate screening method. Pt verbalized understanding.   Shingrix- she has had both  Covid vaccine-she has had both Moderna vaccines, due for booster but wants to wait until after the new year  Flu shot- done 9-2021  Eye exam- done fall of 2021  Sees the dentist regularly       Patient Active Problem List   Diagnosis Code    Intramural leiomyoma of uterus D25.1    Endometrial hyperplasia with atypia N85.02    Essential hypertension I10    Plantar fasciitis, bilateral M72.2    BMI 32.0-32.9,adult Z68.32    Kidney stone N20.0    Severe obesity (HCC) E66.01    Prediabetes R73.03       Past Medical History:   Diagnosis Date    Fibroids     H/O abdominal hysterectomy     Kidney stone     Menorrhagia     Vitamin D deficiency          Current Outpatient Medications:     lisinopriL (PRINIVIL, ZESTRIL) 10 mg tablet, Take 1 tablet by mouth once daily, Disp: 90 Tab, Rfl: 1    Allergies   Allergen Reactions    Penicillins Rash and Itching    Sulfa (Sulfonamide Antibiotics) Itching       Past Surgical History:   Procedure Laterality Date    HX CHOLECYSTECTOMY      HX GYN      hysterectomy    HX HEENT      tonsillectomy    GA BIOPSY OF BREAST, NEEDLE CORE Right 01/12/2017    Benign    GA BREAST SURGERY PROCEDURE UNLISTED  12/10    breast bx-benign       Social History     Tobacco Use   Smoking Status Never Smoker   Smokeless Tobacco Never Used       Social History     Socioeconomic History    Marital status:    Tobacco Use    Smoking status: Never Smoker    Smokeless tobacco: Never Used   Substance and Sexual Activity    Alcohol use: Yes     Comment: occasionally    Drug use: No       Family History   Problem Relation Age of Onset    Cancer Father 80        skin    Heart Disease Father     Hypertension Father     Stroke Father     Post-op Nausea/Vomiting Mother     Hypertension Mother     Diabetes Mother     Hypertension Brother        ROS:  Gen: denies fever, chills, or fatigue  HEENT:denies H/A, nasal congestion, or sore throat  Resp: denies dyspnea, cough, or wheezing  CV: denies chest pain, pressure, or palpitations  Extremeties: denies edema  GI[de-identified] denies abdominal pain, nausea, vomiting, diarrhea, or constipation  Musculoskeletal: no joint pain, stiffness, or muscle cramps  Neuro: denies numbness/tingling or dizziness  Skin: denies rashes or new lesions   Psych: denies anxiety, depression, christiano, or other changes in mood      Objective:     Visit Vitals  /71 (BP 1 Location: Left arm, BP Patient Position: Sitting)   Pulse 64   Temp (!) 96.3 °F (35.7 °C) (Temporal)   Resp 18   Ht 5' 6\" (1.676 m)   Wt 213 lb 6 oz (96.8 kg)   SpO2 99%   BMI 34.44 kg/m²         General: Alert and oriented. No acute distress. +obese. HEENT :  Eyes: Sclera white, conjunctiva clear. PERRLA. Extra ocular movements intact. Neck: Supple with FROM. No thyroid-megaly or lymphadenopathy  Lungs: Breathing even and unlabored. All lobes clear to auscultation bilaterally   Heart :RRR, S1 and S2 normal intensity, no extra heart sounds  Extremities: Non-edematous. Neuro: Cranial nerves grossly normal.  Psych: Mood and thought content appropriate for situation. Dressed appropriately and with good hygiene. Skin: Warm, dry, and intact. No lesions or discoloration. Assessment/ Plan:     HTN  BP at goal  Cont Lisinopril  Check CBC and CMP  Low-sodium diet  Exercise  RTO or go to ER for any CP, SOB, dizziness, or swelling. F/U:  6 months    Obesity  She would like to try Phentermine again as she has gained weight back by snacking and stress eating  Discussed with patient that Phentermine is not to be used long term, only 3 months  Side effects reviewed, notify me or go to ER for any CP, SOB, dizziness, or palpitations  Script sent for 37.5mg- take 1/2 pill BID (#30,0)   checked  Advised her to continue dieting- recommended a sugar free, low carb diet with regular exercise  F/U 1 month      Prediabetes  Recheck A1C  Cont working on sugar free, low carb diet with exercise and work on weight loss  F/U 6 months    Abnormal mammogram, left breast  Follow up mammo in March 2022 as recommended by Roxana Wall.     Health maintenance  PAP- s/p hysterectomy  Colonoscopy- has never had one, she was referred to GI at last appt but never went. Her  is doing the Cologuard test and she would like to do that instead. No FH of colon cancer. No personal hx of diverticulitis or other bowel issues. Discussed it is possible to have a false neg and false pos and colonoscopy is most accurate screening method. Pt verbalized understanding. Shingrix- she has had both  Covid vaccine-she has had both Moderna vaccines, due for booster but wants to wait until after the new year  Flu shot- done 9-2021  Eye exam- done fall of 2021  Sees the dentist regularly        Verbal and written instructions (see AVS) provided.  Patient expresses understanding of diagnosis and treatment plan. Health Maintenance Due   Topic Date Due    Hepatitis C Screening  Never done    Colorectal Cancer Screening Combo  Never done    COVID-19 Vaccine (3 - Booster for Moderna series) 09/06/2021    A1C test (Diabetic or Prediabetic)  12/10/2021               Tim Merida, GERSON

## 2021-12-17 NOTE — PATIENT INSTRUCTIONS
Colon Cancer Screening: Care Instructions  Overview     Colorectal cancer occurs in the colon or rectum. That's the lower part of your digestive system. It often starts in small growths called polyps in the colon or rectum. Polyps are usually found with screening tests. Depending on the type of test, any polyps found may be removed during the tests. Colorectal cancer usually does not cause symptoms at first. But regular tests can help find it early, before it spreads and becomes harder to treat. Your risk for colorectal cancer gets higher as you get older. Experts recommend starting screening at age 39 for people who are at average risk. Talk with your doctor about your risk and when to start and stop screening. You may have one of several tests. Follow-up care is a key part of your treatment and safety. Be sure to make and go to all appointments, and call your doctor if you are having problems. It's also a good idea to know your test results and keep a list of the medicines you take. What are the main screening tests for colon cancer? The screening tests are:  Stool tests. These include the guaiac fecal occult blood test (gFOBT), the fecal immunochemical test (FIT), and the combined fecal immunochemical test and stool DNA test (FIT-DNA). These tests check stool samples for signs of cancer. If your test is positive, you will need to have a colonoscopy. Sigmoidoscopy. This test lets your doctor look at the lining of your rectum and the lowest part of your colon. Your doctor uses a lighted tube called a sigmoidoscope. This test can't find cancers or polyps in the upper part of your colon. In some cases, polyps that are found can be removed. But if your doctor finds polyps, you will need to have a colonoscopy to check the upper part of your colon. Colonoscopy. This test lets your doctor look at the lining of your rectum and your entire colon. The doctor uses a thin, flexible tool called a colonoscope. It can also be used to remove polyps or get a tissue sample (biopsy). A less common test is CT colonography (CTC). It's also called virtual colonoscopy. Who should be screened for colorectal cancer? Your risk for colorectal cancer gets higher as you get older. Experts recommend starting screening at age 39 for people who are at average risk. Talk with your doctor about your risk and when to start and stop screening. How often you need screening depends on the type of test you get:  Stool tests. Every year for FIT or gFOBT. Every 1 to 3 years for sDNA, also called FIT-DNA. Tests that look inside the colon. Every 5 years for sigmoidoscopy. (If you do the FIT test every year, you can get this test every 10 years.)   Every 5 years for CT colonography (virtual colonoscopy). Every 10 years for colonoscopy. Experts agree that people at higher risk may need to be tested sooner and more often. This includes people who have a strong family history of colon cancer. Talk to your doctor about which test is best for you and when to be tested. When should you call for help? Watch closely for changes in your health, and be sure to contact your doctor if:    · You have any changes in your bowel habits.     · You have any problems. Where can you learn more? Go to http://www.gray.com/  Enter M541 in the search box to learn more about \"Colon Cancer Screening: Care Instructions. \"  Current as of: December 17, 2020               Content Version: 13.0  © 4667-8606 [employer]. Care instructions adapted under license by [employer] (which disclaims liability or warranty for this information). If you have questions about a medical condition or this instruction, always ask your healthcare professional. [place] [address] any warranty or liability for your use of this information.

## 2021-12-17 NOTE — PROGRESS NOTES
1. Have you been to the ER, urgent care clinic since your last visit? Hospitalized since your last visit? No    2. Have you seen or consulted any other health care providers outside of the [address] [place] [address] since your last visit? Include any pap smears or colon screening.  No     Chief Complaint   Patient presents with    Hypertension     Visit Vitals  /71 (BP 1 Location: Left arm, BP Patient Position: Sitting)   Pulse 64   Temp (!) 96.3 °F (35.7 °C) (Temporal)   Resp 18   Ht 5' 6\" (1.676 m)   Wt 213 lb 6 oz (96.8 kg)   LMP 10/26/2012   SpO2 99%   BMI 34.44 kg/m²

## 2021-12-18 LAB
ALBUMIN SERPL-MCNC: 3.8 G/DL (ref 3.5–5)
ALBUMIN/GLOB SERPL: 1.3 {RATIO} (ref 1.1–2.2)
ALP SERPL-CCNC: 83 U/L (ref 45–117)
ALT SERPL-CCNC: 21 U/L (ref 12–78)
ANION GAP SERPL CALC-SCNC: 4 MMOL/L (ref 5–15)
AST SERPL-CCNC: 13 U/L (ref 15–37)
BILIRUB SERPL-MCNC: 0.3 MG/DL (ref 0.2–1)
BUN SERPL-MCNC: 20 MG/DL (ref 6–20)
BUN/CREAT SERPL: 19 (ref 12–20)
CALCIUM SERPL-MCNC: 8.9 MG/DL (ref 8.5–10.1)
CHLORIDE SERPL-SCNC: 108 MMOL/L (ref 97–108)
CHOLEST SERPL-MCNC: 152 MG/DL
CO2 SERPL-SCNC: 27 MMOL/L (ref 21–32)
CREAT SERPL-MCNC: 1.06 MG/DL (ref 0.55–1.02)
EST. AVERAGE GLUCOSE BLD GHB EST-MCNC: 111 MG/DL
GLOBULIN SER CALC-MCNC: 2.9 G/DL (ref 2–4)
GLUCOSE SERPL-MCNC: 109 MG/DL (ref 65–100)
HBA1C MFR BLD: 5.5 % (ref 4–5.6)
HDLC SERPL-MCNC: 51 MG/DL
HDLC SERPL: 3 {RATIO} (ref 0–5)
LDLC SERPL CALC-MCNC: 87.4 MG/DL (ref 0–100)
POTASSIUM SERPL-SCNC: 4.2 MMOL/L (ref 3.5–5.1)
PROT SERPL-MCNC: 6.7 G/DL (ref 6.4–8.2)
SODIUM SERPL-SCNC: 139 MMOL/L (ref 136–145)
TRIGL SERPL-MCNC: 68 MG/DL (ref ?–150)
TSH SERPL DL<=0.05 MIU/L-ACNC: 1.63 UIU/ML (ref 0.36–3.74)
VLDLC SERPL CALC-MCNC: 13.6 MG/DL

## 2021-12-19 NOTE — PROGRESS NOTES
Labs look great including blood count, blood sugar, cholesterol, thyroid and liver function. Her kidney function has been slightly decreased for a long time now and it may be related to the lisinopril. Her BP is always very well controlled. She could try reducing to 1/2 pill daily and recheck kidney function and BP in 1 month. Any heavy NSAID use?

## 2021-12-21 NOTE — PROGRESS NOTES
2 pt identifiers verified name and , pt aware, states understands    Pt scheduled appt for  to come back and recheck bp and labs

## 2022-01-20 ENCOUNTER — LAB ONLY (OUTPATIENT)
Dept: FAMILY MEDICINE CLINIC | Age: 52
End: 2022-01-20
Payer: COMMERCIAL

## 2022-01-20 VITALS
DIASTOLIC BLOOD PRESSURE: 77 MMHG | OXYGEN SATURATION: 99 % | HEART RATE: 63 BPM | SYSTOLIC BLOOD PRESSURE: 127 MMHG | RESPIRATION RATE: 18 BRPM

## 2022-01-20 DIAGNOSIS — I10 ESSENTIAL HYPERTENSION: Primary | ICD-10-CM

## 2022-01-20 DIAGNOSIS — R31.9 URINARY TRACT INFECTION WITH HEMATURIA, SITE UNSPECIFIED: ICD-10-CM

## 2022-01-20 DIAGNOSIS — N18.31 STAGE 3A CHRONIC KIDNEY DISEASE (HCC): ICD-10-CM

## 2022-01-20 DIAGNOSIS — N39.0 URINARY TRACT INFECTION WITH HEMATURIA, SITE UNSPECIFIED: ICD-10-CM

## 2022-01-20 PROCEDURE — 81003 URINALYSIS AUTO W/O SCOPE: CPT | Performed by: NURSE PRACTITIONER

## 2022-01-20 NOTE — PROGRESS NOTES
BP checked and labs drawn per Shala Hu NP.     Visit Vitals  /77   Pulse 63   Resp 18   LMP 10/26/2012   SpO2 99%

## 2022-01-21 DIAGNOSIS — N18.31 STAGE 3A CHRONIC KIDNEY DISEASE (HCC): Primary | ICD-10-CM

## 2022-01-21 LAB
ALBUMIN SERPL-MCNC: 3.5 G/DL (ref 3.5–5)
ALBUMIN/GLOB SERPL: 1.2 {RATIO} (ref 1.1–2.2)
ALP SERPL-CCNC: 89 U/L (ref 45–117)
ALT SERPL-CCNC: 25 U/L (ref 12–78)
ANION GAP SERPL CALC-SCNC: 3 MMOL/L (ref 5–15)
AST SERPL-CCNC: 13 U/L (ref 15–37)
BILIRUB SERPL-MCNC: 0.4 MG/DL (ref 0.2–1)
BILIRUB UR QL STRIP: NEGATIVE
BUN SERPL-MCNC: 22 MG/DL (ref 6–20)
BUN/CREAT SERPL: 19 (ref 12–20)
CALCIUM SERPL-MCNC: 8.6 MG/DL (ref 8.5–10.1)
CHLORIDE SERPL-SCNC: 106 MMOL/L (ref 97–108)
CO2 SERPL-SCNC: 29 MMOL/L (ref 21–32)
CREAT SERPL-MCNC: 1.18 MG/DL (ref 0.55–1.02)
GLOBULIN SER CALC-MCNC: 3 G/DL (ref 2–4)
GLUCOSE SERPL-MCNC: 115 MG/DL (ref 65–100)
GLUCOSE UR-MCNC: NEGATIVE MG/DL
KETONES P FAST UR STRIP-MCNC: NEGATIVE MG/DL
PH UR STRIP: 6 [PH] (ref 4.6–8)
POTASSIUM SERPL-SCNC: 4.1 MMOL/L (ref 3.5–5.1)
PROT SERPL-MCNC: 6.5 G/DL (ref 6.4–8.2)
PROT UR QL STRIP: NORMAL
SODIUM SERPL-SCNC: 138 MMOL/L (ref 136–145)
SP GR UR STRIP: 1.02 (ref 1–1.03)
UA UROBILINOGEN AMB POC: NORMAL (ref 0.2–1)
URINALYSIS CLARITY POC: CLEAR
URINALYSIS COLOR POC: YELLOW
URINE BLOOD POC: NORMAL
URINE LEUKOCYTES POC: NORMAL
URINE NITRITES POC: NEGATIVE

## 2022-01-21 NOTE — PROGRESS NOTES
Kidney function is worse, it has been down over the past 3-4 years so this is not a new problem. please have her resume her normal dose of Lisinopril as this is not the cause.  I'd like her to come in and give a urine sample sometime to check for blood in urine- may have a kidney stone

## 2022-01-23 DIAGNOSIS — R79.89 ELEVATED SERUM CREATININE: ICD-10-CM

## 2022-01-23 DIAGNOSIS — R31.21 ASYMPTOMATIC MICROSCOPIC HEMATURIA: Primary | ICD-10-CM

## 2022-01-23 LAB
BACTERIA SPEC CULT: NORMAL
SERVICE CMNT-IMP: NORMAL

## 2022-01-23 NOTE — PROGRESS NOTES
She has blood in her urine. I have ordered a ct of kidneys to check for stones.  Please give her number to call and schedule

## 2022-01-31 ENCOUNTER — HOSPITAL ENCOUNTER (OUTPATIENT)
Dept: CT IMAGING | Age: 52
Discharge: HOME | End: 2022-01-31
Payer: COMMERCIAL

## 2022-01-31 DIAGNOSIS — R79.89 ELEVATED SERUM CREATININE: ICD-10-CM

## 2022-01-31 DIAGNOSIS — R31.21 ASYMPTOMATIC MICROSCOPIC HEMATURIA: ICD-10-CM

## 2022-01-31 DIAGNOSIS — N20.0 BILATERAL RENAL STONES: Primary | ICD-10-CM

## 2022-01-31 PROCEDURE — 74176 CT ABD & PELVIS W/O CONTRAST: CPT

## 2022-02-01 ENCOUNTER — DOCUMENTATION ONLY (OUTPATIENT)
Dept: FAMILY MEDICINE CLINIC | Age: 52
End: 2022-02-01

## 2022-02-01 NOTE — PROGRESS NOTES
She has multiple kidney stones in both kidneys. Needs to see a urologist for treatment. Referral order placed to Dr Boni Arteaga. Please give her office info to schedule appt.

## 2022-02-03 ENCOUNTER — TRANSCRIBE ORDER (OUTPATIENT)
Dept: SCHEDULING | Age: 52
End: 2022-02-03

## 2022-02-03 DIAGNOSIS — N26.1 LEFT RENAL ATROPHY: Primary | ICD-10-CM

## 2022-02-08 ENCOUNTER — DOCUMENTATION ONLY (OUTPATIENT)
Dept: FAMILY MEDICINE CLINIC | Age: 52
End: 2022-02-08

## 2022-02-08 NOTE — PROGRESS NOTES
Pt seen by Dr Francisco Javier Moise on 2-3-22 for multiple bilateral renal stones. There is an LEFT obstructing 17mm UPJ stone with hydronephrosis. Cr 1.18. Plan is for cystoscopy with stent placement followed by an interval MAG3 scan to determine residual function.

## 2022-03-10 ENCOUNTER — HOSPITAL ENCOUNTER (OUTPATIENT)
Dept: NUCLEAR MEDICINE | Age: 52
Discharge: HOME | End: 2022-03-10
Attending: UROLOGY
Payer: COMMERCIAL

## 2022-03-10 DIAGNOSIS — N26.1 LEFT RENAL ATROPHY: ICD-10-CM

## 2022-03-10 PROCEDURE — 74011250636 HC RX REV CODE- 250/636: Performed by: UROLOGY

## 2022-03-10 PROCEDURE — 78708 K FLOW/FUNCT IMAGE W/DRUG: CPT

## 2022-03-10 RX ORDER — BETIATIDE 1 MG/1
10.4 INJECTION, POWDER, LYOPHILIZED, FOR SOLUTION INTRAVENOUS
Status: COMPLETED | OUTPATIENT
Start: 2022-03-10 | End: 2022-03-10

## 2022-03-10 RX ORDER — FUROSEMIDE 10 MG/ML
20 INJECTION INTRAMUSCULAR; INTRAVENOUS ONCE
Status: COMPLETED | OUTPATIENT
Start: 2022-03-10 | End: 2022-03-10

## 2022-03-10 RX ADMIN — FUROSEMIDE 20 MG: 10 INJECTION, SOLUTION INTRAMUSCULAR; INTRAVENOUS at 11:00

## 2022-03-10 RX ADMIN — BETIATIDE 10.4 MILLICURIE: 1 INJECTION, POWDER, LYOPHILIZED, FOR SOLUTION INTRAVENOUS at 11:00

## 2022-03-19 PROBLEM — M72.2 PLANTAR FASCIITIS, BILATERAL: Status: ACTIVE | Noted: 2017-03-21

## 2022-03-19 PROBLEM — I10 ESSENTIAL HYPERTENSION: Status: ACTIVE | Noted: 2017-03-21

## 2022-03-20 PROBLEM — R73.03 PREDIABETES: Status: ACTIVE | Noted: 2021-05-19

## 2022-03-20 PROBLEM — E66.01 SEVERE OBESITY (HCC): Status: ACTIVE | Noted: 2020-12-10

## 2022-03-26 ENCOUNTER — DOCUMENTATION ONLY (OUTPATIENT)
Dept: FAMILY MEDICINE CLINIC | Age: 52
End: 2022-03-26

## 2022-06-06 ENCOUNTER — TRANSCRIBE ORDER (OUTPATIENT)
Dept: SCHEDULING | Age: 52
End: 2022-06-06

## 2022-06-06 DIAGNOSIS — N13.30 HYDRONEPHROSIS: Primary | ICD-10-CM

## 2022-06-07 ENCOUNTER — DOCUMENTATION ONLY (OUTPATIENT)
Dept: FAMILY MEDICINE CLINIC | Age: 52
End: 2022-06-07

## 2022-06-07 NOTE — PROGRESS NOTES
Pt was seen by urologist Dr Cande Daniels on 6-6-22. She is s/p left URS/PCLN. KUB shows a few small residual fragments 3mm on the left. Stent was removed in the office today. Stone analysis is pending. They discussed ESWL versus ureteroscopy for the remaining contralateral renal stone. She will follow up in 1 month.

## 2022-06-15 ENCOUNTER — HOSPITAL ENCOUNTER (OUTPATIENT)
Dept: NUCLEAR MEDICINE | Age: 52
Discharge: HOME | End: 2022-06-15
Attending: UROLOGY
Payer: COMMERCIAL

## 2022-06-15 DIAGNOSIS — N13.30 HYDRONEPHROSIS: ICD-10-CM

## 2022-06-15 PROCEDURE — 74011250636 HC RX REV CODE- 250/636: Performed by: UROLOGY

## 2022-06-15 PROCEDURE — 78708 K FLOW/FUNCT IMAGE W/DRUG: CPT

## 2022-06-15 RX ORDER — BETIATIDE 1 MG/1
10.4 INJECTION, POWDER, LYOPHILIZED, FOR SOLUTION INTRAVENOUS
Status: COMPLETED | OUTPATIENT
Start: 2022-06-15 | End: 2022-06-15

## 2022-06-15 RX ORDER — FUROSEMIDE 10 MG/ML
20 INJECTION INTRAMUSCULAR; INTRAVENOUS ONCE
Status: COMPLETED | OUTPATIENT
Start: 2022-06-15 | End: 2022-06-15

## 2022-06-15 RX ORDER — FUROSEMIDE 10 MG/ML
40 INJECTION INTRAMUSCULAR; INTRAVENOUS ONCE
Status: DISCONTINUED | OUTPATIENT
Start: 2022-06-15 | End: 2022-06-15

## 2022-06-15 RX ADMIN — FUROSEMIDE 20 MG: 10 INJECTION, SOLUTION INTRAMUSCULAR; INTRAVENOUS at 12:04

## 2022-06-15 RX ADMIN — BETIATIDE 10.4 MILLICURIE: 1 INJECTION, POWDER, LYOPHILIZED, FOR SOLUTION INTRAVENOUS at 10:45

## 2022-06-17 ENCOUNTER — OFFICE VISIT (OUTPATIENT)
Dept: FAMILY MEDICINE CLINIC | Age: 52
End: 2022-06-17
Payer: COMMERCIAL

## 2022-06-17 VITALS
HEART RATE: 54 BPM | TEMPERATURE: 97.9 F | OXYGEN SATURATION: 99 % | DIASTOLIC BLOOD PRESSURE: 58 MMHG | HEIGHT: 66 IN | RESPIRATION RATE: 18 BRPM | BODY MASS INDEX: 35.74 KG/M2 | WEIGHT: 222.38 LBS | SYSTOLIC BLOOD PRESSURE: 115 MMHG

## 2022-06-17 DIAGNOSIS — I10 ESSENTIAL HYPERTENSION: Primary | ICD-10-CM

## 2022-06-17 DIAGNOSIS — R94.31 ABNORMAL EKG: ICD-10-CM

## 2022-06-17 DIAGNOSIS — R00.1 BRADYCARDIA: ICD-10-CM

## 2022-06-17 DIAGNOSIS — N20.0 RIGHT RENAL STONE: ICD-10-CM

## 2022-06-17 DIAGNOSIS — N18.31 STAGE 3A CHRONIC KIDNEY DISEASE (HCC): ICD-10-CM

## 2022-06-17 DIAGNOSIS — E66.01 SEVERE OBESITY (HCC): ICD-10-CM

## 2022-06-17 DIAGNOSIS — Z11.59 ENCOUNTER FOR HEPATITIS C SCREENING TEST FOR LOW RISK PATIENT: ICD-10-CM

## 2022-06-17 DIAGNOSIS — R73.03 PREDIABETES: ICD-10-CM

## 2022-06-17 PROCEDURE — 99214 OFFICE O/P EST MOD 30 MIN: CPT | Performed by: NURSE PRACTITIONER

## 2022-06-17 PROCEDURE — 93000 ELECTROCARDIOGRAM COMPLETE: CPT | Performed by: NURSE PRACTITIONER

## 2022-06-17 RX ORDER — LISINOPRIL 10 MG/1
TABLET ORAL
Qty: 90 TABLET | Refills: 3 | Status: SHIPPED | OUTPATIENT
Start: 2022-06-17

## 2022-06-17 NOTE — PROGRESS NOTES
Subjective:     CC: HTN    Cristo Gonzales is a 45 y.o. female who presents today for a 6 follow up for HTN. New issues: Bradycardia  Her heart rate is slow today. She is asymptomatic. She is not on a beta blocker. Will get EKG and labs today. Kidney stones  Pt was seen by urologist Dr Melania Jc on 6-6-22. She is s/p left URS/PCLN. KUB shows a few small residual fragments 3mm on the left. Stent was removed. They discussed ESWL versus ureteroscopy for the remaining contralateral renal stone. She will follow up on 6-27. CKD stage 3    Lab Results   Component Value Date/Time    Creatinine 1.18 (H) 01/20/2022 07:09 AM     She avoids NSAIDS. Tries to drink lots of water daily. Will recheck creatinine today. HTN  BP is at goal today. She is on Lisinopril 10mg daily. Denies any CP, SOB, dizziness, or swelling. She has gained 10 pounds since the last appt, her nephrologist told her to limit exercise. Hx of Prediabetes with obesity  Lab Results   Component Value Date/Time    Hemoglobin A1c 5.5 12/17/2021 07:39 AM     She struggles with dieting. Has gained weight due to lack of exercise since she had her kidney surgery. Denies any polyuria or polydipsia. Abnormal mammogram, left breast  She had an abnormal mammogram in left breast back in January of 2021 that prompted a diagnostic mammogram in Feb. It was recommended that she a have a repeat in 6 months. This was done at the [facility] and she was told the lesion has decreased but still present and recommended she follow up for another mammo in March 2022. This mammogram showed benign masses in bilateral breasts. Radiologist recommended follow up in 1 year. Health maintenance  PAP- s/p hysterectomy  Colonoscopy- has never had one, she had a neg Cologuard test in 1-2022.   Shingrix- up to date  Covid vaccine-she has had both Moderna vaccines, due for booster- not interested  Flu shot- done 9-2021  Eye exam- done fall of 2021  Sees the dentist regularly       Patient Active Problem List   Diagnosis Code    Intramural leiomyoma of uterus D25.1    Endometrial hyperplasia with atypia N85.02    Essential hypertension I10    Plantar fasciitis, bilateral M72.2    BMI 32.0-32.9,adult Z68.32    Kidney stone N20.0    Severe obesity (HCC) E66.01    Prediabetes R73.03       Past Medical History:   Diagnosis Date    Fibroids     H/O abdominal hysterectomy     Kidney stone     Menorrhagia     Vitamin D deficiency          Current Outpatient Medications:     phentermine (ADIPEX-P) 37.5 mg tablet, Take 1/2 pill twice a day, Disp: 30 Tablet, Rfl: 0    lisinopriL (PRINIVIL, ZESTRIL) 10 mg tablet, Take 1 tablet by mouth once daily, Disp: 90 Tablet, Rfl: 3    Allergies   Allergen Reactions    Penicillins Rash and Itching    Sulfa (Sulfonamide Antibiotics) Itching       Past Surgical History:   Procedure Laterality Date    HX CHOLECYSTECTOMY      HX GYN      hysterectomy    HX HEENT      tonsillectomy    [place] BIOPSY OF BREAST, NEEDLE CORE Right 01/12/2017    Benign    [place] BREAST SURGERY PROCEDURE UNLISTED  12/10    breast bx-benign       Social History     Tobacco Use   Smoking Status Never Smoker   Smokeless Tobacco Never Used       Social History     Socioeconomic History    Marital status:    Tobacco Use    Smoking status: Never Smoker    Smokeless tobacco: Never Used   Substance and Sexual Activity    Alcohol use: Yes     Comment: occasionally    Drug use: No       Family History   Problem Relation Age of Onset    Cancer Father 80        skin    Heart Disease Father     Hypertension Father     Stroke Father     Post-op Nausea/Vomiting Mother     Hypertension Mother     Diabetes Mother     Hypertension Brother        ROS:  Gen: denies fever, chills, or fatigue  HEENT:denies H/A, nasal congestion, or sore throat  Resp: denies dyspnea, cough, or wheezing  CV: denies chest pain, pressure, or palpitations  Extremeties: denies edema  GI[de-identified] denies abdominal pain, nausea, vomiting, diarrhea, or constipation  Musculoskeletal: no joint pain, stiffness, or muscle cramps  Neuro: denies numbness/tingling or dizziness  Skin: denies rashes or new lesions   Psych: denies anxiety, depression, christiano, or other changes in mood      Objective:     Visit Vitals  BP (!) 115/58 (BP 1 Location: Left arm, BP Patient Position: Sitting)   Pulse (!) 54   Temp 97.9 °F (36.6 °C) (Temporal)   Resp 18   Ht 5' 6\" (1.676 m)   Wt 222 lb 6 oz (100.9 kg)   SpO2 99%   BMI 35.89 kg/m²       General: Alert and oriented. No acute distress. +obese. HEENT :  Eyes: Sclera white, conjunctiva clear. PERRLA. Extra ocular movements intact. Neck: Supple with FROM. No thyroid-megaly or lymphadenopathy  Lungs: Breathing even and unlabored. All lobes clear to auscultation bilaterally   Heart : +Bradycardia, Reg Rhythm, S1 and S2 normal intensity, no extra heart sounds  Extremities: Non-edematous. Neuro: Cranial nerves grossly normal.  Psych: Mood and thought content appropriate for situation. Dressed appropriately and with good hygiene. Skin: Warm, dry, and intact. No lesions or discoloration. Assessment/ Plan:     HTN  BP at goal  Cont Lisinopril  Check CBC and CMP  Low-sodium diet  Exercise  RTO or go to ER for any CP, SOB, dizziness, or swelling.   F/U: 6 months    Bradycardia  Ekg done today- shows sinus shakir with incomplete R BBB  Referred to cardiology- advised to let me know if they cannot see her within the next month  Check CBC, CMP, and TSH- will call with results  Advised to go to ER or call 911 for any CP, SOB, dizziness, syncope, or other emergencies    Renal stone with CKD stage 3  Per nephrology  Recheck creatinine  Avoid NSAIDS  Stay well hydrated  F/U here 6 months    Obesity with Prediabetes  Recheck A1C  Cont working on sugar free, low carb diet with exercise and work on weight loss  F/U 6 months    Encounter for screening for Hep C  One time Hep C screening done today        Verbal and written instructions (see AVS) provided.  Patient expresses understanding of diagnosis and treatment plan. Health Maintenance Due   Topic Date Due    Hepatitis C Screening  Never done    COVID-19 Vaccine (3 - Booster for Moderna series) 08/06/2021               Doc Leger.  Betty Cortez, NP

## 2022-06-17 NOTE — PROGRESS NOTES
1. \"Have you been to the ER, urgent care clinic since your last visit? Hospitalized since your last visit? \" No    2. \"Have you seen or consulted any other health care providers outside of the [address] [place] [address] since your last visit? \" Dr. Bethany Melvin, urologist, had procedure done to break up kidney stone, did not have lithotripsy. 3. For patients aged 39-70: Has the patient had a colonoscopy / FIT/ Cologuard? Yes - no Care Gap present      If the patient is female:    4. For patients aged 41-77: Has the patient had a mammogram within the past 2 years? Yes - no Care Gap present      5. For patients aged 21-65: Has the patient had a pap smear?  Yes - no Care Gap present      Chief Complaint   Patient presents with    Hypertension     Visit Vitals  BP (!) 115/58 (BP 1 Location: Left arm, BP Patient Position: Sitting)   Pulse (!) 54   Temp 97.9 °F (36.6 °C) (Temporal)   Resp 18   Ht 5' 6\" (1.676 m)   Wt 222 lb 6 oz (100.9 kg)   LMP 10/26/2012   SpO2 99%   BMI 35.89 kg/m²

## 2022-06-18 LAB
ANION GAP SERPL CALC-SCNC: 5 MMOL/L (ref 5–15)
BUN SERPL-MCNC: 26 MG/DL (ref 6–20)
BUN/CREAT SERPL: 17 (ref 12–20)
CALCIUM SERPL-MCNC: 8.9 MG/DL (ref 8.5–10.1)
CHLORIDE SERPL-SCNC: 107 MMOL/L (ref 97–108)
CO2 SERPL-SCNC: 27 MMOL/L (ref 21–32)
CREAT SERPL-MCNC: 1.53 MG/DL (ref 0.55–1.02)
ERYTHROCYTE [DISTWIDTH] IN BLOOD BY AUTOMATED COUNT: 13.2 % (ref 11.5–14.5)
EST. AVERAGE GLUCOSE BLD GHB EST-MCNC: 120 MG/DL
GLUCOSE SERPL-MCNC: 126 MG/DL (ref 65–100)
HBA1C MFR BLD: 5.8 % (ref 4–5.6)
HCT VFR BLD AUTO: 41.2 % (ref 35–47)
HCV AB SERPL QL IA: NONREACTIVE
HGB BLD-MCNC: 13.1 G/DL (ref 11.5–16)
MCH RBC QN AUTO: 28.6 PG (ref 26–34)
MCHC RBC AUTO-ENTMCNC: 31.8 G/DL (ref 30–36.5)
MCV RBC AUTO: 90 FL (ref 80–99)
NRBC # BLD: 0 K/UL (ref 0–0.01)
NRBC BLD-RTO: 0 PER 100 WBC
PLATELET # BLD AUTO: 262 K/UL (ref 150–400)
PMV BLD AUTO: 11 FL (ref 8.9–12.9)
POTASSIUM SERPL-SCNC: 4.4 MMOL/L (ref 3.5–5.1)
RBC # BLD AUTO: 4.58 M/UL (ref 3.8–5.2)
SODIUM SERPL-SCNC: 139 MMOL/L (ref 136–145)
TSH SERPL DL<=0.05 MIU/L-ACNC: 1.22 UIU/ML (ref 0.36–3.74)
WBC # BLD AUTO: 6.5 K/UL (ref 3.6–11)

## 2022-06-18 NOTE — PROGRESS NOTES
Blood sugar is a little higher. Work on diet. Cut back on carbs (bread, potatos, rice, pasta, cereal) and sugar (desserts, fruit, fruit juices, sodas, and sweet tea). Kidney function is a little worse, cont to avoid NSAIDS and drink lots of water daily.  F/U with urology as scheduled

## 2022-06-20 ENCOUNTER — DOCUMENTATION ONLY (OUTPATIENT)
Dept: FAMILY MEDICINE CLINIC | Age: 52
End: 2022-06-20

## 2022-06-27 ENCOUNTER — TRANSCRIBE ORDER (OUTPATIENT)
Dept: SCHEDULING | Age: 52
End: 2022-06-27

## 2022-06-27 DIAGNOSIS — N13.30 HYDRONEPHROSIS: Primary | ICD-10-CM

## 2022-07-05 ENCOUNTER — DOCUMENTATION ONLY (OUTPATIENT)
Dept: FAMILY MEDICINE CLINIC | Age: 52
End: 2022-07-05

## 2022-07-06 ENCOUNTER — OFFICE VISIT (OUTPATIENT)
Dept: CARDIOLOGY CLINIC | Age: 52
End: 2022-07-06
Payer: COMMERCIAL

## 2022-07-06 ENCOUNTER — CLINICAL SUPPORT (OUTPATIENT)
Dept: CARDIOLOGY CLINIC | Age: 52
End: 2022-07-06

## 2022-07-06 VITALS
HEART RATE: 62 BPM | HEIGHT: 66 IN | RESPIRATION RATE: 18 BRPM | BODY MASS INDEX: 35.36 KG/M2 | SYSTOLIC BLOOD PRESSURE: 110 MMHG | OXYGEN SATURATION: 100 % | DIASTOLIC BLOOD PRESSURE: 72 MMHG | WEIGHT: 220 LBS

## 2022-07-06 DIAGNOSIS — R73.03 PREDIABETES: ICD-10-CM

## 2022-07-06 DIAGNOSIS — R00.1 BRADYCARDIA: ICD-10-CM

## 2022-07-06 DIAGNOSIS — I10 ESSENTIAL HYPERTENSION: Primary | ICD-10-CM

## 2022-07-06 DIAGNOSIS — R00.1 BRADYCARDIA: Primary | ICD-10-CM

## 2022-07-06 PROCEDURE — 93225 XTRNL ECG REC<48 HRS REC: CPT | Performed by: INTERNAL MEDICINE

## 2022-07-06 PROCEDURE — 93227 XTRNL ECG REC<48 HR R&I: CPT | Performed by: INTERNAL MEDICINE

## 2022-07-06 PROCEDURE — 99204 OFFICE O/P NEW MOD 45 MIN: CPT | Performed by: SPECIALIST

## 2022-07-06 NOTE — PROGRESS NOTES
HISTORY OF PRESENT ILLNESS  Ankita Houser is a 45 y.o. female     SUMMARY:   Problem List  Date Reviewed: 7/6/2022          Codes Class Noted    Stage 3a chronic kidney disease ([facility] ICD-10-CM: N18.31  ICD-9-CM: 585.3  6/17/2022        Prediabetes ICD-10-CM: R73.03  ICD-9-CM: 790.29  5/19/2021        Severe obesity ([facility] ICD-10-CM: E66.01  ICD-9-CM: 278.01  12/10/2020        Kidney stone ICD-10-CM: N20.0  ICD-9-CM: 592.0  Unknown        BMI 32.0-32.9,adult ICD-10-CM: L40.80  ICD-9-CM: V85.32  1/29/2018        Essential hypertension ICD-10-CM: I10  ICD-9-CM: 401.9  3/21/2017        Plantar fasciitis, bilateral ICD-10-CM: M72.2  ICD-9-CM: 728.71  3/21/2017        Intramural leiomyoma of uterus ICD-10-CM: D25.1  ICD-9-CM: 218.1  10/18/2012        Endometrial hyperplasia with atypia ICD-10-CM: N85.02  ICD-9-CM: 621.33  10/18/2012              Current Outpatient Medications on File Prior to Visit   Medication Sig    lisinopriL (PRINIVIL, ZESTRIL) 10 mg tablet Take 1 tablet by mouth once daily     No current facility-administered medications on file prior to visit. CARDIOLOGY STUDIES TO DATE:  No specialty comments available. Chief Complaint   Patient presents with    New Patient     HPI :  She is referred by her primary care for evaluation of bradycardia. She has been undergoing a number of different procedures for kidney stones with obstruction and she recently saw her PCP at which point her heart rate and blood pressure were a little low. She was completely asymptomatic and other than heart rate her EKG is completely normal.  She has well-controlled hypertension and recently was found to have prediabetes. Cholesterols been okay she is never smoked and family history is negative for premature coronary disease. 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   CARDIAC ROS: negative for chest pain, dyspnea, palpitations, syncope, orthopnea, paroxysmal nocturnal dyspnea, exertional chest pressure/discomfort, claudication, lower extremity edema    Family History   Problem Relation Age of Onset    Cancer Father 80        skin    Heart Disease Father         heart attack 76    Hypertension Father     Stroke Father     Post-op Nausea/Vomiting Mother     Hypertension Mother     Diabetes Mother     Hypertension Brother        Past Medical History:   Diagnosis Date    Fibroids     H/O abdominal hysterectomy     Kidney stone     Menorrhagia     Vitamin D deficiency        GENERAL ROS:  A comprehensive review of systems was negative except for that written in the HPI. Visit Vitals  /72 (BP 1 Location: Right arm, BP Patient Position: Sitting, BP Cuff Size: Adult)   Pulse 62   Resp 18   Ht 5' 6\" (1.676 m)   Wt 220 lb (99.8 kg)   LMP 10/26/2012   SpO2 100%   BMI 35.51 kg/m²       Wt Readings from Last 3 Encounters:   07/06/22 220 lb (99.8 kg)   06/17/22 222 lb 6 oz (100.9 kg)   12/17/21 213 lb 6 oz (96.8 kg)            BP Readings from Last 3 Encounters:   07/06/22 110/72   06/17/22 (!) 115/58   01/20/22 127/77       PHYSICAL EXAM  General appearance: alert, cooperative, no distress, appears stated age  Neurologic: Alert and oriented X 3  Neck: supple, symmetrical, trachea midline, no adenopathy, no carotid bruit and no JVD  Lungs: clear to auscultation bilaterally  Heart: regular rate and rhythm, S1, S2 normal, no murmur, click, rub or gallop  Abdomen: soft, non-tender.  Bowel sounds normal. No masses,  no organomegaly  Extremities: extremities normal, atraumatic, no cyanosis or edema  Pulses: 2+ and symmetric    Lab Results   Component Value Date/Time    Cholesterol, total 152 12/17/2021 07:39 AM    Cholesterol, total 155 12/10/2020 10:24 AM    Cholesterol, total 155 12/22/2016 08:56 AM    Cholesterol, total 150 11/16/2011 05:39 PM    HDL Cholesterol 51 12/17/2021 07:39 AM HDL Cholesterol 51 12/10/2020 10:24 AM    HDL Cholesterol 47 12/22/2016 08:56 AM    HDL Cholesterol 49 11/16/2011 05:39 PM    LDL, calculated 87.4 12/17/2021 07:39 AM    LDL, calculated 90 12/10/2020 10:24 AM    LDL, calculated 91 12/22/2016 08:56 AM    LDL, calculated 83 11/16/2011 05:39 PM    Triglyceride 68 12/17/2021 07:39 AM    Triglyceride 73 12/10/2020 10:24 AM    Triglyceride 85 12/22/2016 08:56 AM    Triglyceride 92 11/16/2011 05:39 PM    CHOL/HDL Ratio 3.0 12/17/2021 07:39 AM     ASSESSMENT :      She is completely asymptomatic so I suspect the bradycardia is not anything to be concerned about at this point and she really has minimal cardiac risk factors and no worrisome symptoms. Just to be on the safe side were going to do a 48-hour Holter on her. Assuming that looks okay I see no reason why she cannot continue to pursue her her urological issues and procedures without special precautions or further cardiac testing. current treatment plan is effective, no change in therapy  lab results and schedule of future lab studies reviewed with patient  reviewed diet, exercise and weight control    Encounter Diagnoses   Name Primary?  Essential hypertension Yes    Bradycardia     Prediabetes      No orders of the defined types were placed in this encounter. Follow-up and Dispositions    · Return if symptoms worsen or fail to improve. Nick Zayas MD  7/6/2022  Please note that this dictation was completed with [employer], the computer voice recognition software. Quite often unanticipated grammatical, syntax, homophones, and other interpretive errors are inadvertently transcribed by the computer software. Please disregard these errors. Please excuse any errors that have escaped final proofreading. Thank you.

## 2022-07-27 ENCOUNTER — TELEPHONE (OUTPATIENT)
Dept: CARDIOLOGY CLINIC | Age: 52
End: 2022-07-27

## 2022-07-27 ENCOUNTER — PATIENT MESSAGE (OUTPATIENT)
Dept: CARDIOLOGY CLINIC | Age: 52
End: 2022-07-27

## 2022-07-27 ENCOUNTER — DOCUMENTATION ONLY (OUTPATIENT)
Dept: CARDIOLOGY CLINIC | Age: 52
End: 2022-07-27

## 2022-07-27 NOTE — PROGRESS NOTES
Holter showed no sig slow heart beats, occasional skipped beats and short runs of atrial tachycardia.  All good and no worries

## 2022-08-03 ENCOUNTER — TELEPHONE (OUTPATIENT)
Dept: CARDIOLOGY CLINIC | Age: 52
End: 2022-08-03

## 2022-08-03 NOTE — TELEPHONE ENCOUNTER
Please call patient with holter monitor results    Dr. Harvey Fritz said holter showed no significant slow heart beats, occasional skipped beats, and short runs of atrial tachycardia (a fast heart beat). He said nothing dangerous noted so all good, no worries, no need for concern.

## 2022-08-22 ENCOUNTER — HOSPITAL ENCOUNTER (OUTPATIENT)
Dept: CT IMAGING | Age: 52
Discharge: HOME | End: 2022-08-22
Attending: UROLOGY
Payer: COMMERCIAL

## 2022-08-22 DIAGNOSIS — N13.30 HYDRONEPHROSIS: ICD-10-CM

## 2022-08-22 PROCEDURE — 74176 CT ABD & PELVIS W/O CONTRAST: CPT

## 2022-09-12 ENCOUNTER — DOCUMENTATION ONLY (OUTPATIENT)
Dept: FAMILY MEDICINE CLINIC | Age: 52
End: 2022-09-12

## 2022-09-12 NOTE — PROGRESS NOTES
Pt seen by urology for follow up. Stent out. KUB suggesting 4.5 and 5mm residual fragments in right distal ureter. She was placed back on Floax to help these stones pass.  F/U 1 month

## 2022-10-27 ENCOUNTER — TRANSCRIBE ORDER (OUTPATIENT)
Dept: SCHEDULING | Age: 52
End: 2022-10-27

## 2022-10-27 DIAGNOSIS — N20.1 URETERAL STONE: Primary | ICD-10-CM

## 2022-10-31 ENCOUNTER — HOSPITAL ENCOUNTER (OUTPATIENT)
Dept: CT IMAGING | Age: 52
Discharge: HOME | End: 2022-10-31
Attending: UROLOGY
Payer: COMMERCIAL

## 2022-10-31 DIAGNOSIS — N20.1 URETERAL STONE: ICD-10-CM

## 2022-10-31 PROCEDURE — 74176 CT ABD & PELVIS W/O CONTRAST: CPT

## 2022-11-07 ENCOUNTER — DOCUMENTATION ONLY (OUTPATIENT)
Dept: FAMILY MEDICINE CLINIC | Age: 52
End: 2022-11-07

## 2022-11-08 NOTE — PROGRESS NOTES
Seen by urologist. Residual 3mm stone in bilateral kidneys with atrophy of left kidney. Reviewed CT scan, he could not find a transition point for the left UPJ so he referred her to Dr Carolina Villalba for second opinion. May need pyeloplasty.

## 2022-12-16 ENCOUNTER — OFFICE VISIT (OUTPATIENT)
Dept: FAMILY MEDICINE CLINIC | Age: 52
End: 2022-12-16
Payer: COMMERCIAL

## 2022-12-16 VITALS
OXYGEN SATURATION: 99 % | RESPIRATION RATE: 16 BRPM | SYSTOLIC BLOOD PRESSURE: 116 MMHG | DIASTOLIC BLOOD PRESSURE: 74 MMHG | BODY MASS INDEX: 32.95 KG/M2 | HEIGHT: 66 IN | TEMPERATURE: 97.6 F | HEART RATE: 52 BPM | WEIGHT: 205 LBS

## 2022-12-16 DIAGNOSIS — I10 ESSENTIAL HYPERTENSION: Primary | ICD-10-CM

## 2022-12-16 DIAGNOSIS — R00.1 BRADYCARDIA: ICD-10-CM

## 2022-12-16 DIAGNOSIS — N18.31 STAGE 3A CHRONIC KIDNEY DISEASE (HCC): ICD-10-CM

## 2022-12-16 DIAGNOSIS — R73.03 PREDIABETES: ICD-10-CM

## 2022-12-16 DIAGNOSIS — N20.0 BILATERAL RENAL STONES: ICD-10-CM

## 2022-12-16 DIAGNOSIS — Z13.220 LIPID SCREENING: ICD-10-CM

## 2022-12-16 PROCEDURE — 36415 COLL VENOUS BLD VENIPUNCTURE: CPT | Performed by: NURSE PRACTITIONER

## 2022-12-16 PROCEDURE — 3074F SYST BP LT 130 MM HG: CPT | Performed by: NURSE PRACTITIONER

## 2022-12-16 PROCEDURE — 3078F DIAST BP <80 MM HG: CPT | Performed by: NURSE PRACTITIONER

## 2022-12-16 PROCEDURE — 99214 OFFICE O/P EST MOD 30 MIN: CPT | Performed by: NURSE PRACTITIONER

## 2022-12-16 NOTE — PROGRESS NOTES
Subjective:     CC: HTN    Jessica Shen is a 45 y.o. female who presents today for a 6 follow up for HTN and other chronic medical problems. HTN  BP is at goal today. She is on Lisinopril 10mg daily. Denies any CP, SOB, dizziness, or swelling. She had gained 10 pounds earlier this year due to lack of exercise and fluid retention due to kidney stones, but has lost the weight and now down to 205#! Prediabetes  Lab Results   Component Value Date/Time    Hemoglobin A1c 5.8 (H) 06/17/2022 07:28 AM     She is not on Metformin. Denies any polyuria or polydipsia. Will check A1C today. Bradycardia  She was referred to cardiology several months ago. A holter monitor was ordered and results were benign. She is asymptomatic so they are not concerned. She will follow up prn if symptoms develop. Bilateral kidney stones  She has undergone several procedures this year by her urologist Dr Daniel Montano in attempt to remove multiple bilateral kidney stones. At her last OV with Dr Daniel Montano she still had a residual 3mm stone in both kidneys with atrophy of left kidney. Per urology note, the CT scan was reviewed but Dr Daniel Montano could sot find a transition point for the left UPJ so he referred her to Dr Stefany Burton for second opinion. He thought she may need a pyeloplasty. Today she states she was told that the residual stone fragments should eventually pass on their own and because she is asymptomatic they advised her to follow up in 1 year or sooner if symptoms develop. CKD stage 3  Lab Results   Component Value Date/Time    Creatinine 1.53 (H) 06/17/2022 07:28 AM   She avoids NSAIDS. Tries to drink lots of water daily. Will recheck creatinine today. Abnormal mammogram, left breast  She had an abnormal mammogram in left breast back in January of 2021 that prompted a diagnostic mammogram in Feb. It was recommended that she a have a repeat in 6 months.  This was done at the [facility] and she was told the lesion has decreased but still present and recommended she follow up for another mammo in March 2022. This mammogram showed benign masses in bilateral breasts. Radiologist recommended follow up in 1 year (March 2023). Health maintenance  PAP- s/p hysterectomy  Colonoscopy- has never had one, she had a neg Cologuard test in 1-2022. Shingrix- up to date  Covid vaccine-she has had both Moderna vaccines, due for booster- not interested  Flu shot- up to date  Eye exam- done fall of 2021, upcoming appt January 2023  Sees the dentist regularly       Patient Active Problem List   Diagnosis Code    Intramural leiomyoma of uterus D25.1    Endometrial hyperplasia with atypia N85.02    Essential hypertension I10    Plantar fasciitis, bilateral M72.2    BMI 32.0-32.9,adult Z68.32    Kidney stone N20.0    Severe obesity ([address] E66.01    Prediabetes R73.03    Stage 3a chronic kidney disease ([address] N18.31       Past Medical History:   Diagnosis Date    Fibroids     H/O abdominal hysterectomy     Kidney stone     Menorrhagia     Vitamin D deficiency          Current Outpatient Medications:     lisinopriL (PRINIVIL, ZESTRIL) 10 mg tablet, Take 1 tablet by mouth once daily, Disp: 90 Tablet, Rfl: 3    Allergies   Allergen Reactions    Penicillins Rash and Itching    Sulfa (Sulfonamide Antibiotics) Itching       Past Surgical History:   Procedure Laterality Date    HX CHOLECYSTECTOMY      HX GYN      hysterectomy    HX HEENT      tonsillectomy    [place] BIOPSY OF BREAST, NEEDLE CORE Right 01/12/2017    Benign    [place] BREAST SURGERY PROCEDURE UNLISTED  12/10    breast bx-benign       Social History     Tobacco Use   Smoking Status Never   Smokeless Tobacco Never       Social History     Socioeconomic History    Marital status:    Tobacco Use    Smoking status: Never    Smokeless tobacco: Never   Substance and Sexual Activity    Alcohol use:  Yes     Alcohol/week: 1.0 standard drink     Types: 1 Glasses of wine per week     Comment: occasionally Drug use: No       Family History   Problem Relation Age of Onset    Cancer Father 80        skin    Heart Disease Father         heart attack 76    Hypertension Father     Stroke Father     Post-op Nausea/Vomiting Mother     Hypertension Mother     Diabetes Mother     Hypertension Brother        ROS:  Gen: denies fever, chills, or fatigue  HEENT:denies H/A, nasal congestion, or sore throat  Resp: denies dyspnea, cough, or wheezing  CV: denies chest pain, pressure, or palpitations  Extremeties: denies edema  GI[de-identified] denies abdominal pain, nausea, vomiting, diarrhea, or constipation  Musculoskeletal: no joint pain, stiffness, or muscle cramps  Neuro: denies numbness/tingling or dizziness  Skin: denies rashes or new lesions   Psych: denies anxiety, depression, christiano, or other changes in mood      Objective:     Visit Vitals  /74 (BP 1 Location: Left upper arm)   Pulse (!) 52   Temp 97.6 °F (36.4 °C) (Oral)   Resp 16   Ht 5' 6\" (1.676 m)   Wt 205 lb (93 kg)   SpO2 99%   BMI 33.09 kg/m²       General: Alert and oriented. No acute distress. +obese. HEENT :  Eyes: Sclera white, conjunctiva clear. PERRLA. Extra ocular movements intact. Neck: Supple with FROM. No thyroid-megaly or lymphadenopathy  Lungs: Breathing even and unlabored. All lobes clear to auscultation bilaterally   Heart : +Bradycardia, Reg Rhythm, S1 and S2 normal intensity, no extra heart sounds  Extremities: Non-edematous. Neuro: Cranial nerves grossly normal.  Psych: Mood and thought content appropriate for situation. Dressed appropriately and with good hygiene. Skin: Warm, dry, and intact. No lesions or discoloration. Assessment/ Plan:     HTN  BP at goal  Cont Lisinopril  Low-sodium diet  Exercise  RTO or go to ER for any CP, SOB, dizziness, or swelling.   F/U: 6 months    Bradycardia  Stable  Cleared by cardioloy  F/U 6 months or sooner prn CP, SOB, dizziness, fatigue, or syncope    Renal stones with CKD stage 3  Urologist has told her that the residual stone fragments should pass on their own  Check CBC and BMP today  Cont to avoid NSAIDS  Cont to stay well hydrated  F/U with urology in 1 year or sooner prn if symptoms develop  F/U here 6 months or sooner prn    Prediabetes  Recheck A1C  Cont working on sugar free, low carb diet with exercise and work on weight loss    Abnormal mammogram, left breast  She is due for repeat mammogram (March 2023). She is aware    Screening for lipid disorders  Lipid panel drawn today        Verbal and written instructions (see AVS) provided. Patient expresses understanding of diagnosis and treatment plan. Health Maintenance Due   Topic Date Due    Cervical cancer screen  Never done    Flu Vaccine (1) 08/01/2022               Mary Valdez.  Danae Morrell NP

## 2022-12-16 NOTE — PROGRESS NOTES
Identified pt with two pt identifiers(name and ). Reviewed record in preparation for visit and have obtained necessary documentation. Chief Complaint   Patient presents with    Hypertension       1. \"Have you been to the ER, urgent care clinic since your last visit? Hospitalized since your last visit? \" No    2. \"Have you seen or consulted any other health care providers outside of the [address] [place] [address] since your last visit? \" No     3. For patients aged 39-70: Has the patient had a colonoscopy / FIT/ Cologuard? Yes - no Care Gap present      If the patient is female:    4. For patients aged 41-77: Has the patient had a mammogram within the past 2 years? Yes - no Care Gap present      5. For patients aged 21-65: Has the patient had a pap smear?  No

## 2022-12-17 LAB
ANION GAP SERPL CALC-SCNC: 5 MMOL/L (ref 5–15)
BUN SERPL-MCNC: 31 MG/DL (ref 6–20)
BUN/CREAT SERPL: 30 (ref 12–20)
CALCIUM SERPL-MCNC: 9.3 MG/DL (ref 8.5–10.1)
CHLORIDE SERPL-SCNC: 105 MMOL/L (ref 97–108)
CHOLEST SERPL-MCNC: 166 MG/DL
CO2 SERPL-SCNC: 28 MMOL/L (ref 21–32)
CREAT SERPL-MCNC: 1.04 MG/DL (ref 0.55–1.02)
ERYTHROCYTE [DISTWIDTH] IN BLOOD BY AUTOMATED COUNT: 12.7 % (ref 11.5–14.5)
EST. AVERAGE GLUCOSE BLD GHB EST-MCNC: 114 MG/DL
GLUCOSE SERPL-MCNC: 109 MG/DL (ref 65–100)
HBA1C MFR BLD: 5.6 % (ref 4–5.6)
HCT VFR BLD AUTO: 44.8 % (ref 35–47)
HDLC SERPL-MCNC: 57 MG/DL
HDLC SERPL: 2.9 {RATIO} (ref 0–5)
HGB BLD-MCNC: 14.4 G/DL (ref 11.5–16)
LDLC SERPL CALC-MCNC: 92.4 MG/DL (ref 0–100)
MCH RBC QN AUTO: 29.4 PG (ref 26–34)
MCHC RBC AUTO-ENTMCNC: 32.1 G/DL (ref 30–36.5)
MCV RBC AUTO: 91.6 FL (ref 80–99)
NRBC # BLD: 0 K/UL (ref 0–0.01)
NRBC BLD-RTO: 0 PER 100 WBC
PLATELET # BLD AUTO: 226 K/UL (ref 150–400)
PMV BLD AUTO: 11.7 FL (ref 8.9–12.9)
POTASSIUM SERPL-SCNC: 4.6 MMOL/L (ref 3.5–5.1)
RBC # BLD AUTO: 4.89 M/UL (ref 3.8–5.2)
SODIUM SERPL-SCNC: 138 MMOL/L (ref 136–145)
TRIGL SERPL-MCNC: 83 MG/DL (ref ?–150)
VLDLC SERPL CALC-MCNC: 16.6 MG/DL
WBC # BLD AUTO: 7.4 K/UL (ref 3.6–11)

## 2022-12-20 PROBLEM — R73.03 PREDIABETES: Status: RESOLVED | Noted: 2021-05-19 | Resolved: 2022-12-20

## 2022-12-20 NOTE — PROGRESS NOTES
Cholesterol is within normal range, blood count and blood sugar is normal, kidney function has much improved.  No need to see nephrologist

## 2023-05-31 RX ORDER — LISINOPRIL 10 MG/1
TABLET ORAL
Qty: 90 TABLET | Refills: 0 | Status: SHIPPED | OUTPATIENT
Start: 2023-05-31

## 2023-06-16 PROBLEM — Z87.442 HISTORY OF KIDNEY STONES: Status: ACTIVE | Noted: 2023-06-16

## 2023-06-16 PROBLEM — Z87.898 HISTORY OF PREDIABETES: Status: ACTIVE | Noted: 2023-06-16

## 2023-06-16 PROBLEM — M72.2 PLANTAR FASCIITIS, BILATERAL: Status: RESOLVED | Noted: 2017-03-21 | Resolved: 2023-06-16

## 2023-06-16 PROBLEM — R00.1 BRADYCARDIA, UNSPECIFIED: Status: ACTIVE | Noted: 2023-06-16

## 2023-06-16 PROBLEM — N18.31 STAGE 3A CHRONIC KIDNEY DISEASE (HCC): Status: RESOLVED | Noted: 2022-06-17 | Resolved: 2023-06-16

## 2024-03-26 RX ORDER — LISINOPRIL 10 MG/1
10 TABLET ORAL DAILY
Qty: 90 TABLET | Refills: 0 | Status: SHIPPED | OUTPATIENT
Start: 2024-03-26

## 2024-05-06 ENCOUNTER — TELEPHONE (OUTPATIENT)
Age: 54
End: 2024-05-06

## 2024-05-06 DIAGNOSIS — Z12.31 ENCOUNTER FOR SCREENING MAMMOGRAM FOR MALIGNANT NEOPLASM OF BREAST: Primary | ICD-10-CM

## 2024-05-06 NOTE — TELEPHONE ENCOUNTER
----- Message from Sarath Bee sent at 5/6/2024 12:33 PM EDT -----  Subject: Referral Request    Reason for referral request? wants to have a mammogram   Provider patient wants to be referred to(if known):     Provider Phone Number(if known):    Additional Information for Provider?   ---------------------------------------------------------------------------  --------------  CALL BACK INFO    [phone]; OK to leave message on voicemail  ---------------------------------------------------------------------------  --------------

## 2024-05-09 ENCOUNTER — HOSPITAL ENCOUNTER (OUTPATIENT)
Facility: HOSPITAL | Age: 54
Discharge: HOME | End: 2024-05-09
Payer: COMMERCIAL

## 2024-05-09 VITALS — BODY MASS INDEX: 35.84 KG/M2 | WEIGHT: 223 LBS | HEIGHT: 66 IN

## 2024-05-09 DIAGNOSIS — Z12.31 ENCOUNTER FOR SCREENING MAMMOGRAM FOR MALIGNANT NEOPLASM OF BREAST: ICD-10-CM

## 2024-05-09 PROCEDURE — 77063 BREAST TOMOSYNTHESIS BI: CPT

## 2024-05-16 ENCOUNTER — OFFICE VISIT (OUTPATIENT)
Age: 54
End: 2024-05-16
Payer: COMMERCIAL

## 2024-05-16 VITALS
SYSTOLIC BLOOD PRESSURE: 106 MMHG | HEART RATE: 67 BPM | DIASTOLIC BLOOD PRESSURE: 56 MMHG | WEIGHT: 234 LBS | TEMPERATURE: 97.6 F | OXYGEN SATURATION: 96 % | BODY MASS INDEX: 37.61 KG/M2 | RESPIRATION RATE: 20 BRPM | HEIGHT: 66 IN

## 2024-05-16 DIAGNOSIS — Z13.220 ENCOUNTER FOR LIPID SCREENING FOR CARDIOVASCULAR DISEASE: ICD-10-CM

## 2024-05-16 DIAGNOSIS — F43.21 GRIEF REACTION: ICD-10-CM

## 2024-05-16 DIAGNOSIS — I10 PRIMARY HYPERTENSION: Primary | ICD-10-CM

## 2024-05-16 DIAGNOSIS — R63.5 WEIGHT GAIN: ICD-10-CM

## 2024-05-16 DIAGNOSIS — R60.9 FLUID RETENTION: ICD-10-CM

## 2024-05-16 DIAGNOSIS — Z13.6 ENCOUNTER FOR LIPID SCREENING FOR CARDIOVASCULAR DISEASE: ICD-10-CM

## 2024-05-16 PROCEDURE — 3078F DIAST BP <80 MM HG: CPT | Performed by: NURSE PRACTITIONER

## 2024-05-16 PROCEDURE — 3074F SYST BP LT 130 MM HG: CPT | Performed by: NURSE PRACTITIONER

## 2024-05-16 PROCEDURE — 99214 OFFICE O/P EST MOD 30 MIN: CPT | Performed by: NURSE PRACTITIONER

## 2024-05-16 RX ORDER — TOPIRAMATE 50 MG/1
TABLET, FILM COATED ORAL
Qty: 60 TABLET | Refills: 1 | Status: SHIPPED | OUTPATIENT
Start: 2024-05-16

## 2024-05-16 ASSESSMENT — PATIENT HEALTH QUESTIONNAIRE - PHQ9
4. FEELING TIRED OR HAVING LITTLE ENERGY: NEARLY EVERY DAY
5. POOR APPETITE OR OVEREATING: NEARLY EVERY DAY
3. TROUBLE FALLING OR STAYING ASLEEP: SEVERAL DAYS
6. FEELING BAD ABOUT YOURSELF - OR THAT YOU ARE A FAILURE OR HAVE LET YOURSELF OR YOUR FAMILY DOWN: NOT AT ALL
10. IF YOU CHECKED OFF ANY PROBLEMS, HOW DIFFICULT HAVE THESE PROBLEMS MADE IT FOR YOU TO DO YOUR WORK, TAKE CARE OF THINGS AT HOME, OR GET ALONG WITH OTHER PEOPLE: SOMEWHAT DIFFICULT
2. FEELING DOWN, DEPRESSED OR HOPELESS: SEVERAL DAYS
SUM OF ALL RESPONSES TO PHQ QUESTIONS 1-9: 13
9. THOUGHTS THAT YOU WOULD BE BETTER OFF DEAD, OR OF HURTING YOURSELF: NOT AT ALL
SUM OF ALL RESPONSES TO PHQ9 QUESTIONS 1 & 2: 4
8. MOVING OR SPEAKING SO SLOWLY THAT OTHER PEOPLE COULD HAVE NOTICED. OR THE OPPOSITE, BEING SO FIGETY OR RESTLESS THAT YOU HAVE BEEN MOVING AROUND A LOT MORE THAN USUAL: MORE THAN HALF THE DAYS
SUM OF ALL RESPONSES TO PHQ QUESTIONS 1-9: 13
7. TROUBLE CONCENTRATING ON THINGS, SUCH AS READING THE NEWSPAPER OR WATCHING TELEVISION: NOT AT ALL
1. LITTLE INTEREST OR PLEASURE IN DOING THINGS: NEARLY EVERY DAY

## 2024-05-16 NOTE — PROGRESS NOTES
Chief Complaint   Patient presents with    Depression     Mother passed away      Edema       Vitals:    05/16/24 0719   BP: (!) 106/56   Pulse: 67   Resp: 20   Temp: 97.6 °F (36.4 °C)   SpO2: 96%   \"Have you been to the ER, urgent care clinic since your last visit?  Hospitalized since your last visit?\"    NO    “Have you seen or consulted any other health care providers outside of [facility] since your last visit?”    NO            Click Here for Release of Records Request

## 2024-05-16 NOTE — PROGRESS NOTES
Subjective:     CC: HTN, kidney function    Cris Marques is a 53 y.o. female who presents today for annual follow up for HTN. She would also like her kidney function checked.       HTN  BP is low today. She is on Lisinopril 10mg daily. Denies any CP, SOB, or dizziness.  Checks blood pressure on occasion and gets readings in the 110s.  She is retaining fluid.  Has noticed swelling in her ankles at the end of the day.  It does go down overnight. Started a month ago.  She admits she has been eating more restaurant food with a lot of sodium.      Obesity  BMI 37  Weight today 234lbs, up from 223lbs in , up from 205 lbs in .  Since her mother  in February unexpectedly she has gained weight.  Would like to take something to help her lose weight.  I explained to her that her insurance does not cover any of the weight loss medications.  She states that she craves sweets.  I did mention Topamax which may help curb her food cravings.  We will also check a TSH.  She is walking daily for exercise.  We discussed the importance of cutting carbs and sugar.    Grief reaction   since her mother passed she has been feeling down and depressed.  Denies SI.  Her mother lived next-door so they were very close. Has a lot friends and family for support whom she is able to talk to. She has also been talking to her .  Not interested in counseling at this time.  Not interestd in starting any medication. States she only feels depressed on some days.  Not every day.  We will continue to monitor      Lab Results   Component Value Date    CREATININE 1.18 (H) 2023        She had trouble with kidney stones back in  and her creatinine went up to 1.53. it improved to 1.04 as of , then up to 1.18 in . Will recheck today.        HX of Bradycardia  HR normal today. She was referred to cardiology back in . A holter monitor was ordered and results were benign. She is asymptomatic so they are not concerned.

## 2024-05-17 PROBLEM — N18.30 CKD (CHRONIC KIDNEY DISEASE) STAGE 3, GFR 30-59 ML/MIN (HCC): Status: ACTIVE | Noted: 2022-06-17

## 2024-05-17 LAB
ALBUMIN SERPL-MCNC: 3.7 G/DL (ref 3.5–5)
ALBUMIN/GLOB SERPL: 1.2 (ref 1.1–2.2)
ALP SERPL-CCNC: 85 U/L (ref 45–117)
ALT SERPL-CCNC: 32 U/L (ref 12–78)
ANION GAP SERPL CALC-SCNC: 5 MMOL/L (ref 5–15)
AST SERPL-CCNC: ABNORMAL U/L (ref 15–37)
BASOPHILS # BLD: 0.1 K/UL (ref 0–0.1)
BASOPHILS NFR BLD: 1 % (ref 0–1)
BILIRUB SERPL-MCNC: 0.4 MG/DL (ref 0.2–1)
BUN SERPL-MCNC: 21 MG/DL (ref 6–20)
BUN/CREAT SERPL: 19 (ref 12–20)
CALCIUM SERPL-MCNC: 9 MG/DL (ref 8.5–10.1)
CHLORIDE SERPL-SCNC: 110 MMOL/L (ref 97–108)
CHOLEST SERPL-MCNC: 160 MG/DL
CO2 SERPL-SCNC: 24 MMOL/L (ref 21–32)
CREAT SERPL-MCNC: 1.11 MG/DL (ref 0.55–1.02)
DIFFERENTIAL METHOD BLD: NORMAL
EOSINOPHIL # BLD: 0.4 K/UL (ref 0–0.4)
EOSINOPHIL NFR BLD: 5 % (ref 0–7)
ERYTHROCYTE [DISTWIDTH] IN BLOOD BY AUTOMATED COUNT: 13.2 % (ref 11.5–14.5)
GLOBULIN SER CALC-MCNC: 3.2 G/DL (ref 2–4)
GLUCOSE SERPL-MCNC: 123 MG/DL (ref 65–100)
HCT VFR BLD AUTO: 42.6 % (ref 35–47)
HDLC SERPL-MCNC: 51 MG/DL
HDLC SERPL: 3.1 (ref 0–5)
HGB BLD-MCNC: 13.9 G/DL (ref 11.5–16)
IMM GRANULOCYTES # BLD AUTO: 0 K/UL (ref 0–0.04)
IMM GRANULOCYTES NFR BLD AUTO: 0 % (ref 0–0.5)
LDLC SERPL CALC-MCNC: 94 MG/DL (ref 0–100)
LYMPHOCYTES # BLD: 2.3 K/UL (ref 0.8–3.5)
LYMPHOCYTES NFR BLD: 31 % (ref 12–49)
MCH RBC QN AUTO: 29.3 PG (ref 26–34)
MCHC RBC AUTO-ENTMCNC: 32.6 G/DL (ref 30–36.5)
MCV RBC AUTO: 89.9 FL (ref 80–99)
MONOCYTES # BLD: 0.4 K/UL (ref 0–1)
MONOCYTES NFR BLD: 6 % (ref 5–13)
NEUTS SEG # BLD: 4.1 K/UL (ref 1.8–8)
NEUTS SEG NFR BLD: 57 % (ref 32–75)
NRBC # BLD: 0 K/UL (ref 0–0.01)
NRBC BLD-RTO: 0 PER 100 WBC
PLATELET # BLD AUTO: 233 K/UL (ref 150–400)
PMV BLD AUTO: 11.2 FL (ref 8.9–12.9)
POTASSIUM SERPL-SCNC: ABNORMAL MMOL/L (ref 3.5–5.1)
PROT SERPL-MCNC: 6.9 G/DL (ref 6.4–8.2)
RBC # BLD AUTO: 4.74 M/UL (ref 3.8–5.2)
SODIUM SERPL-SCNC: 139 MMOL/L (ref 136–145)
TRIGL SERPL-MCNC: 75 MG/DL
TSH SERPL DL<=0.05 MIU/L-ACNC: 1.56 UIU/ML (ref 0.36–3.74)
VLDLC SERPL CALC-MCNC: 15 MG/DL
WBC # BLD AUTO: 7.2 K/UL (ref 3.6–11)

## 2024-11-11 RX ORDER — LISINOPRIL 10 MG/1
10 TABLET ORAL DAILY
Qty: 90 TABLET | Refills: 0 | Status: SHIPPED | OUTPATIENT
Start: 2024-11-11

## 2024-11-11 NOTE — TELEPHONE ENCOUNTER
Patient requesting refill on     Requested Prescriptions     Pending Prescriptions Disp Refills    lisinopril (PRINIVIL;ZESTRIL) 10 MG tablet [Pharmacy Med Name: Lisinopril 10 MG Oral Tablet] 90 tablet 0     Sig: Take 1 tablet by mouth once daily        Last OV 5-16-24

## 2025-02-10 RX ORDER — LISINOPRIL 10 MG/1
10 TABLET ORAL DAILY
Qty: 90 TABLET | Refills: 0 | OUTPATIENT
Start: 2025-02-10

## 2025-03-02 SDOH — ECONOMIC STABILITY: FOOD INSECURITY: WITHIN THE PAST 12 MONTHS, YOU WORRIED THAT YOUR FOOD WOULD RUN OUT BEFORE YOU GOT MONEY TO BUY MORE.: NEVER TRUE

## 2025-03-02 SDOH — ECONOMIC STABILITY: INCOME INSECURITY: IN THE LAST 12 MONTHS, WAS THERE A TIME WHEN YOU WERE NOT ABLE TO PAY THE MORTGAGE OR RENT ON TIME?: NO

## 2025-03-02 SDOH — ECONOMIC STABILITY: TRANSPORTATION INSECURITY
IN THE PAST 12 MONTHS, HAS THE LACK OF TRANSPORTATION KEPT YOU FROM MEDICAL APPOINTMENTS OR FROM GETTING MEDICATIONS?: NO

## 2025-03-02 SDOH — ECONOMIC STABILITY: FOOD INSECURITY: WITHIN THE PAST 12 MONTHS, THE FOOD YOU BOUGHT JUST DIDN'T LAST AND YOU DIDN'T HAVE MONEY TO GET MORE.: NEVER TRUE

## 2025-03-02 SDOH — ECONOMIC STABILITY: TRANSPORTATION INSECURITY
IN THE PAST 12 MONTHS, HAS LACK OF TRANSPORTATION KEPT YOU FROM MEETINGS, WORK, OR FROM GETTING THINGS NEEDED FOR DAILY LIVING?: NO

## 2025-03-05 ENCOUNTER — OFFICE VISIT (OUTPATIENT)
Age: 55
End: 2025-03-05
Payer: COMMERCIAL

## 2025-03-05 VITALS
OXYGEN SATURATION: 99 % | DIASTOLIC BLOOD PRESSURE: 82 MMHG | HEIGHT: 66 IN | WEIGHT: 233.8 LBS | BODY MASS INDEX: 37.57 KG/M2 | HEART RATE: 65 BPM | SYSTOLIC BLOOD PRESSURE: 131 MMHG | TEMPERATURE: 97.1 F | RESPIRATION RATE: 18 BRPM

## 2025-03-05 DIAGNOSIS — R10.9 RIGHT FLANK PAIN: ICD-10-CM

## 2025-03-05 DIAGNOSIS — Z12.11 SCREENING FOR COLON CANCER: ICD-10-CM

## 2025-03-05 DIAGNOSIS — H61.21 IMPACTED CERUMEN OF RIGHT EAR: ICD-10-CM

## 2025-03-05 DIAGNOSIS — E66.812 CLASS 2 SEVERE OBESITY DUE TO EXCESS CALORIES WITH SERIOUS COMORBIDITY AND BODY MASS INDEX (BMI) OF 37.0 TO 37.9 IN ADULT: ICD-10-CM

## 2025-03-05 DIAGNOSIS — N18.30 STAGE 3 CHRONIC KIDNEY DISEASE, UNSPECIFIED WHETHER STAGE 3A OR 3B CKD (HCC): ICD-10-CM

## 2025-03-05 DIAGNOSIS — Z12.31 ENCOUNTER FOR SCREENING MAMMOGRAM FOR MALIGNANT NEOPLASM OF BREAST: ICD-10-CM

## 2025-03-05 DIAGNOSIS — I10 PRIMARY HYPERTENSION: Primary | ICD-10-CM

## 2025-03-05 DIAGNOSIS — E66.01 CLASS 2 SEVERE OBESITY DUE TO EXCESS CALORIES WITH SERIOUS COMORBIDITY AND BODY MASS INDEX (BMI) OF 37.0 TO 37.9 IN ADULT: ICD-10-CM

## 2025-03-05 LAB
BILIRUBIN, URINE, POC: NEGATIVE
BLOOD URINE, POC: NEGATIVE
GLUCOSE URINE, POC: NEGATIVE
KETONES, URINE, POC: NEGATIVE
LEUKOCYTE ESTERASE, URINE, POC: NEGATIVE
NITRITE, URINE, POC: NEGATIVE
PH, URINE, POC: 6.5 (ref 4.6–8)
PROTEIN,URINE, POC: NEGATIVE
SPECIFIC GRAVITY, URINE, POC: 1.02 (ref 1–1.03)
URINALYSIS CLARITY, POC: NORMAL
URINALYSIS COLOR, POC: NORMAL
UROBILINOGEN, POC: NORMAL MG/DL

## 2025-03-05 PROCEDURE — 3075F SYST BP GE 130 - 139MM HG: CPT | Performed by: NURSE PRACTITIONER

## 2025-03-05 PROCEDURE — 3079F DIAST BP 80-89 MM HG: CPT | Performed by: NURSE PRACTITIONER

## 2025-03-05 PROCEDURE — 81001 URINALYSIS AUTO W/SCOPE: CPT | Performed by: NURSE PRACTITIONER

## 2025-03-05 PROCEDURE — 99214 OFFICE O/P EST MOD 30 MIN: CPT | Performed by: NURSE PRACTITIONER

## 2025-03-05 RX ORDER — LISINOPRIL 10 MG/1
10 TABLET ORAL DAILY
Qty: 90 TABLET | Refills: 3 | Status: SHIPPED | OUTPATIENT
Start: 2025-03-05

## 2025-03-05 ASSESSMENT — PATIENT HEALTH QUESTIONNAIRE - PHQ9
7. TROUBLE CONCENTRATING ON THINGS, SUCH AS READING THE NEWSPAPER OR WATCHING TELEVISION: NOT AT ALL
1. LITTLE INTEREST OR PLEASURE IN DOING THINGS: NOT AT ALL
10. IF YOU CHECKED OFF ANY PROBLEMS, HOW DIFFICULT HAVE THESE PROBLEMS MADE IT FOR YOU TO DO YOUR WORK, TAKE CARE OF THINGS AT HOME, OR GET ALONG WITH OTHER PEOPLE: NOT DIFFICULT AT ALL
SUM OF ALL RESPONSES TO PHQ QUESTIONS 1-9: 2
2. FEELING DOWN, DEPRESSED OR HOPELESS: NOT AT ALL
SUM OF ALL RESPONSES TO PHQ QUESTIONS 1-9: 2
8. MOVING OR SPEAKING SO SLOWLY THAT OTHER PEOPLE COULD HAVE NOTICED. OR THE OPPOSITE, BEING SO FIGETY OR RESTLESS THAT YOU HAVE BEEN MOVING AROUND A LOT MORE THAN USUAL: NOT AT ALL
6. FEELING BAD ABOUT YOURSELF - OR THAT YOU ARE A FAILURE OR HAVE LET YOURSELF OR YOUR FAMILY DOWN: NOT AT ALL
SUM OF ALL RESPONSES TO PHQ QUESTIONS 1-9: 2
5. POOR APPETITE OR OVEREATING: SEVERAL DAYS
3. TROUBLE FALLING OR STAYING ASLEEP: NOT AT ALL
9. THOUGHTS THAT YOU WOULD BE BETTER OFF DEAD, OR OF HURTING YOURSELF: NOT AT ALL
4. FEELING TIRED OR HAVING LITTLE ENERGY: SEVERAL DAYS
SUM OF ALL RESPONSES TO PHQ QUESTIONS 1-9: 2

## 2025-03-05 NOTE — PROGRESS NOTES
Labs drawn in right arm and hand, unsuccessful with arm, ok in hand per Bhakti Morales's orders.  Patient tolerated well, difficult vein, slow draw, 1 SST and 1 lavender.  
\"Have you been to the ER, urgent care clinic since your last visit?  Hospitalized since your last visit?\"    NO    “Have you seen or consulted any other health care providers outside of [facility] since your last visit?”    NO        “Have you had a colorectal cancer screening such as a colonoscopy/FIT/Cologuard?    NO    No colonoscopy on file  Date of last Cologuard: 1/19/2022  No FIT/FOBT on file   No flexible sigmoidoscopy on file     Chief Complaint   Patient presents with    Hypertension     6 months fu       Vitals:    03/05/25 1600   BP: 131/82   Pulse: 65   Resp: 18   Temp: 97.1 °F (36.2 °C)   SpO2: 99%       Click Here for Release of Records Request  
with future date 5-2025.   Colonoscopy- has never had one, she had a neg Cologuard test in 1-2022. Due for repeat today, order placed  Shingrix- up to date  Covid 2024 booster- not interested  Flu shot- states this is up to date-will check the [facility] for record  Prevnar 20- due-not addressed today  Eye exam-done (2024) at the [facility] in [place]  Normal lipid screening in 5-2024      Patient Active Problem List   Diagnosis    Essential hypertension    Endometrial hyperplasia with atypia    Intramural leiomyoma of uterus    Severe obesity    CKD (chronic kidney disease) stage 3, GFR 30-59 ml/min ([facility])    History of kidney stones    Bradycardia, unspecified    History of prediabetes       Past Medical History:   Diagnosis Date    Fibroids     H/O abdominal hysterectomy     Kidney stone     Menorrhagia     Vitamin D deficiency          Current Outpatient Medications:     lisinopril (PRINIVIL;ZESTRIL) 10 MG tablet, Take 1 tablet by mouth once daily, Disp: 90 tablet, Rfl: 0    topiramate (TOPAMAX) 50 MG tablet, Take 1 tab at bedtime x 2 weeks then increase to 2 tabs at bedtime., Disp: 60 tablet, Rfl: 1    Allergies   Allergen Reactions    Sulfa Antibiotics Itching    Penicillins Itching and Rash       Past Surgical History:   Procedure Laterality Date    BIOPSY OF BREAST, NEEDLE CORE Right 01/12/2017    Benign    BREAST SURGERY  12/10    breast bx-benign    CHOLECYSTECTOMY      GYN      hysterectomy    HEENT      tonsillectomy       Social History     Tobacco Use   Smoking Status Never    Passive exposure: Never   Smokeless Tobacco Never       Social History     Socioeconomic History    Marital status:    Tobacco Use    Smoking status: Never     Passive exposure: Never    Smokeless tobacco: Never   Vaping Use    Vaping status: Never Used   Substance and Sexual Activity    Alcohol use: Yes     Alcohol/week: 1.0 standard drink of alcohol    Drug use: No    Sexual activity: Yes     Partners: Male     Social

## 2025-03-06 LAB
ALBUMIN SERPL-MCNC: 3.6 G/DL (ref 3.5–5)
ALBUMIN/GLOB SERPL: 1 (ref 1.1–2.2)
ALP SERPL-CCNC: 90 U/L (ref 45–117)
ALT SERPL-CCNC: 42 U/L (ref 12–78)
ANION GAP SERPL CALC-SCNC: 7 MMOL/L (ref 2–12)
AST SERPL-CCNC: 27 U/L (ref 15–37)
BASOPHILS # BLD: 0.07 K/UL (ref 0–0.1)
BASOPHILS NFR BLD: 0.8 % (ref 0–1)
BILIRUB SERPL-MCNC: 0.3 MG/DL (ref 0.2–1)
BUN SERPL-MCNC: 22 MG/DL (ref 6–20)
BUN/CREAT SERPL: 19 (ref 12–20)
CALCIUM SERPL-MCNC: 9.2 MG/DL (ref 8.5–10.1)
CHLORIDE SERPL-SCNC: 106 MMOL/L (ref 97–108)
CO2 SERPL-SCNC: 23 MMOL/L (ref 21–32)
CREAT SERPL-MCNC: 1.17 MG/DL (ref 0.55–1.02)
DIFFERENTIAL METHOD BLD: ABNORMAL
EOSINOPHIL # BLD: 0.64 K/UL (ref 0–0.4)
EOSINOPHIL NFR BLD: 7.2 % (ref 0–7)
ERYTHROCYTE [DISTWIDTH] IN BLOOD BY AUTOMATED COUNT: 13.2 % (ref 11.5–14.5)
GLOBULIN SER CALC-MCNC: 3.5 G/DL (ref 2–4)
GLUCOSE SERPL-MCNC: 113 MG/DL (ref 65–100)
HCT VFR BLD AUTO: 44.6 % (ref 35–47)
HGB BLD-MCNC: 14 G/DL (ref 11.5–16)
IMM GRANULOCYTES # BLD AUTO: 0.04 K/UL (ref 0–0.04)
IMM GRANULOCYTES NFR BLD AUTO: 0.5 % (ref 0–0.5)
LYMPHOCYTES # BLD: 2.5 K/UL (ref 0.8–3.5)
LYMPHOCYTES NFR BLD: 28.1 % (ref 12–49)
MCH RBC QN AUTO: 28.5 PG (ref 26–34)
MCHC RBC AUTO-ENTMCNC: 31.4 G/DL (ref 30–36.5)
MCV RBC AUTO: 90.8 FL (ref 80–99)
MONOCYTES # BLD: 0.41 K/UL (ref 0–1)
MONOCYTES NFR BLD: 4.6 % (ref 5–13)
NEUTS SEG # BLD: 5.24 K/UL (ref 1.8–8)
NEUTS SEG NFR BLD: 58.8 % (ref 32–75)
NRBC # BLD: 0 K/UL (ref 0–0.01)
NRBC BLD-RTO: 0 PER 100 WBC
PLATELET # BLD AUTO: 229 K/UL (ref 150–400)
POTASSIUM SERPL-SCNC: 4.2 MMOL/L (ref 3.5–5.1)
PROT SERPL-MCNC: 7.1 G/DL (ref 6.4–8.2)
RBC # BLD AUTO: 4.91 M/UL (ref 3.8–5.2)
RBC MORPH BLD: ABNORMAL
SODIUM SERPL-SCNC: 136 MMOL/L (ref 136–145)
WBC # BLD AUTO: 8.9 K/UL (ref 3.6–11)

## 2025-03-19 ENCOUNTER — RESULTS FOLLOW-UP (OUTPATIENT)
Age: 55
End: 2025-03-19

## 2025-08-18 ENCOUNTER — HOSPITAL ENCOUNTER (OUTPATIENT)
Facility: HOSPITAL | Age: 55
Discharge: HOME | End: 2025-08-21
Payer: COMMERCIAL

## 2025-08-18 DIAGNOSIS — Z12.31 ENCOUNTER FOR SCREENING MAMMOGRAM FOR MALIGNANT NEOPLASM OF BREAST: ICD-10-CM

## 2025-08-18 PROCEDURE — 77067 SCR MAMMO BI INCL CAD: CPT

## 2025-08-27 LAB — NONINV COLON CA DNA+OCC BLD SCRN STL QL: NEGATIVE
